# Patient Record
Sex: FEMALE | Race: BLACK OR AFRICAN AMERICAN | Employment: UNEMPLOYED | URBAN - METROPOLITAN AREA
[De-identification: names, ages, dates, MRNs, and addresses within clinical notes are randomized per-mention and may not be internally consistent; named-entity substitution may affect disease eponyms.]

---

## 2024-02-16 ENCOUNTER — HOSPITAL ENCOUNTER (EMERGENCY)
Facility: HOSPITAL | Age: 29
Discharge: HOME | End: 2024-02-17
Attending: EMERGENCY MEDICINE
Payer: COMMERCIAL

## 2024-02-16 ENCOUNTER — APPOINTMENT (OUTPATIENT)
Dept: GENERAL RADIOLOGY | Facility: HOSPITAL | Age: 29
End: 2024-02-16
Attending: EMERGENCY MEDICINE
Payer: COMMERCIAL

## 2024-02-16 DIAGNOSIS — R06.09 DYSPNEA ON EXERTION: Primary | ICD-10-CM

## 2024-02-16 DIAGNOSIS — R79.89 HIGH SERUM THYROID STIMULATING HORMONE (TSH): ICD-10-CM

## 2024-02-16 DIAGNOSIS — K90.0 CELIAC DISEASE (HCC): ICD-10-CM

## 2024-02-16 DIAGNOSIS — E66.01 CLASS 3 SEVERE OBESITY WITH BODY MASS INDEX (BMI) OF 50.0 TO 59.9 IN ADULT, UNSPECIFIED OBESITY TYPE, UNSPECIFIED WHETHER SERIOUS COMORBIDITY PRESENT (HCC): ICD-10-CM

## 2024-02-16 DIAGNOSIS — E28.2 PCOS (POLYCYSTIC OVARIAN SYNDROME): ICD-10-CM

## 2024-02-16 DIAGNOSIS — E07.9 THYROID DISEASE: ICD-10-CM

## 2024-02-16 LAB
ALBUMIN SERPL-MCNC: 4.2 G/DL (ref 3.2–4.8)
ALBUMIN/GLOB SERPL: 1.4 {RATIO} (ref 1–2)
ALP LIVER SERPL-CCNC: 49 U/L
ALT SERPL-CCNC: 11 U/L
ANION GAP SERPL CALC-SCNC: 2 MMOL/L (ref 0–18)
AST SERPL-CCNC: 13 U/L (ref ?–34)
BASOPHILS # BLD AUTO: 0.02 X10(3) UL (ref 0–0.2)
BASOPHILS NFR BLD AUTO: 0.3 %
BILIRUB SERPL-MCNC: 0.2 MG/DL (ref 0.3–1.2)
BNP SERPL-MCNC: 10 PG/ML
BUN BLD-MCNC: 10 MG/DL (ref 9–23)
BUN/CREAT SERPL: 10.9 (ref 10–20)
CALCIUM BLD-MCNC: 9.5 MG/DL (ref 8.7–10.4)
CHLORIDE SERPL-SCNC: 110 MMOL/L (ref 98–112)
CO2 SERPL-SCNC: 28 MMOL/L (ref 21–32)
CREAT BLD-MCNC: 0.92 MG/DL
DEPRECATED RDW RBC AUTO: 43.9 FL (ref 35.1–46.3)
EGFRCR SERPLBLD CKD-EPI 2021: 88 ML/MIN/1.73M2 (ref 60–?)
EOSINOPHIL # BLD AUTO: 0.16 X10(3) UL (ref 0–0.7)
EOSINOPHIL NFR BLD AUTO: 2.3 %
ERYTHROCYTE [DISTWIDTH] IN BLOOD BY AUTOMATED COUNT: 13.6 % (ref 11–15)
FLUAV + FLUBV RNA SPEC NAA+PROBE: NEGATIVE
FLUAV + FLUBV RNA SPEC NAA+PROBE: NEGATIVE
GLOBULIN PLAS-MCNC: 2.9 G/DL (ref 2.8–4.4)
GLUCOSE BLD-MCNC: 119 MG/DL (ref 70–99)
HCT VFR BLD AUTO: 35.7 %
HETEROPH AB SER QL: NEGATIVE
HGB BLD-MCNC: 12 G/DL
IMM GRANULOCYTES # BLD AUTO: 0.02 X10(3) UL (ref 0–1)
IMM GRANULOCYTES NFR BLD: 0.3 %
LYMPHOCYTES # BLD AUTO: 2.84 X10(3) UL (ref 1–4)
LYMPHOCYTES NFR BLD AUTO: 41.6 %
MCH RBC QN AUTO: 29.5 PG (ref 26–34)
MCHC RBC AUTO-ENTMCNC: 33.6 G/DL (ref 31–37)
MCV RBC AUTO: 87.7 FL
MONOCYTES # BLD AUTO: 0.41 X10(3) UL (ref 0.1–1)
MONOCYTES NFR BLD AUTO: 6 %
NEUTROPHILS # BLD AUTO: 3.37 X10 (3) UL (ref 1.5–7.7)
NEUTROPHILS # BLD AUTO: 3.37 X10(3) UL (ref 1.5–7.7)
NEUTROPHILS NFR BLD AUTO: 49.5 %
OSMOLALITY SERPL CALC.SUM OF ELEC: 290 MOSM/KG (ref 275–295)
PLATELET # BLD AUTO: 330 10(3)UL (ref 150–450)
POTASSIUM SERPL-SCNC: 4.2 MMOL/L (ref 3.5–5.1)
PROT SERPL-MCNC: 7.1 G/DL (ref 5.7–8.2)
RBC # BLD AUTO: 4.07 X10(6)UL
RSV RNA SPEC NAA+PROBE: NEGATIVE
SARS-COV-2 RNA RESP QL NAA+PROBE: NOT DETECTED
SODIUM SERPL-SCNC: 140 MMOL/L (ref 136–145)
T4 FREE SERPL-MCNC: 0.9 NG/DL (ref 0.8–1.7)
TROPONIN I SERPL HS-MCNC: 3 NG/L
TSI SER-ACNC: 6.76 MIU/ML (ref 0.55–4.78)
WBC # BLD AUTO: 6.8 X10(3) UL (ref 4–11)

## 2024-02-16 PROCEDURE — 80053 COMPREHEN METABOLIC PANEL: CPT | Performed by: EMERGENCY MEDICINE

## 2024-02-16 PROCEDURE — 86664 EPSTEIN-BARR NUCLEAR ANTIGEN: CPT | Performed by: EMERGENCY MEDICINE

## 2024-02-16 PROCEDURE — 93005 ELECTROCARDIOGRAM TRACING: CPT

## 2024-02-16 PROCEDURE — 84443 ASSAY THYROID STIM HORMONE: CPT | Performed by: EMERGENCY MEDICINE

## 2024-02-16 PROCEDURE — 80053 COMPREHEN METABOLIC PANEL: CPT

## 2024-02-16 PROCEDURE — 84484 ASSAY OF TROPONIN QUANT: CPT | Performed by: EMERGENCY MEDICINE

## 2024-02-16 PROCEDURE — 84702 CHORIONIC GONADOTROPIN TEST: CPT

## 2024-02-16 PROCEDURE — 84439 ASSAY OF FREE THYROXINE: CPT | Performed by: EMERGENCY MEDICINE

## 2024-02-16 PROCEDURE — 93010 ELECTROCARDIOGRAM REPORT: CPT

## 2024-02-16 PROCEDURE — 0241U SARS-COV-2/FLU A AND B/RSV BY PCR (GENEXPERT): CPT | Performed by: EMERGENCY MEDICINE

## 2024-02-16 PROCEDURE — 86665 EPSTEIN-BARR CAPSID VCA: CPT | Performed by: EMERGENCY MEDICINE

## 2024-02-16 PROCEDURE — 86308 HETEROPHILE ANTIBODY SCREEN: CPT | Performed by: EMERGENCY MEDICINE

## 2024-02-16 PROCEDURE — 99284 EMERGENCY DEPT VISIT MOD MDM: CPT

## 2024-02-16 PROCEDURE — 85025 COMPLETE CBC W/AUTO DIFF WBC: CPT

## 2024-02-16 PROCEDURE — 85025 COMPLETE CBC W/AUTO DIFF WBC: CPT | Performed by: EMERGENCY MEDICINE

## 2024-02-16 PROCEDURE — 36415 COLL VENOUS BLD VENIPUNCTURE: CPT

## 2024-02-16 PROCEDURE — 99285 EMERGENCY DEPT VISIT HI MDM: CPT

## 2024-02-16 PROCEDURE — 83880 ASSAY OF NATRIURETIC PEPTIDE: CPT | Performed by: EMERGENCY MEDICINE

## 2024-02-16 PROCEDURE — 71045 X-RAY EXAM CHEST 1 VIEW: CPT | Performed by: EMERGENCY MEDICINE

## 2024-02-17 VITALS
TEMPERATURE: 97 F | HEART RATE: 92 BPM | RESPIRATION RATE: 18 BRPM | DIASTOLIC BLOOD PRESSURE: 88 MMHG | SYSTOLIC BLOOD PRESSURE: 143 MMHG | HEIGHT: 66 IN | OXYGEN SATURATION: 99 %

## 2024-02-17 LAB
ATRIAL RATE: 79 BPM
P AXIS: 58 DEGREES
P-R INTERVAL: 140 MS
Q-T INTERVAL: 374 MS
QRS DURATION: 84 MS
QTC CALCULATION (BEZET): 428 MS
R AXIS: 54 DEGREES
T AXIS: 47 DEGREES
VENTRICULAR RATE: 79 BPM

## 2024-02-17 NOTE — ED PROVIDER NOTES
Received signout from  awaiting rest of lab work prior to discharge.  Lab work was unremarkable for acute findings on my interpretation.  The patient was upset as she felt there is no answer for her dyspnea and I discussed the findings with her and it does appear that the patient has had quite an extensive workup over the last 3 weeks involving multiple ED visits with negative D-dimers however ultimately did have a CT PE scan performed in addition to an echocardiogram as well as right heart catheterization for pulmonary hypertension which was negative and has seen cardiology at [facility] in addition to pulmonology for her 3 weeks of dyspnea.  She did request referrals at [facility] as well for a second opinion, I did provide her with both pulmonology and cardiology referrals.  At time of discharge, patient and  were comfortable with discharge plan.

## 2024-02-17 NOTE — ED PROVIDER NOTES
Patient Seen in: [facility] Emergency Department      History     Chief Complaint   Patient presents with    Difficulty Breathing     Stated Complaint: Shortness of Breath    Subjective:   HPI        Objective:   No pertinent past medical history.            No pertinent past surgical history.              No pertinent social history.            Review of Systems    Positive for stated complaint: Shortness of Breath  Other systems are as noted in HPI.  Constitutional and vital signs reviewed.      All other systems reviewed and negative except as noted above.    Physical Exam     ED Triage Vitals [02/16/24 2121]   BP (!) 169/89   Pulse 77   Resp 20   Temp 96.9 °F (36.1 °C)   Temp src Temporal   SpO2 99 %   O2 Device None (Room air)       Current:/88   Pulse 66   Temp 96.9 °F (36.1 °C) (Temporal)   Resp 24   Ht 167.6 cm (5' 6\")   SpO2 97%         Physical Exam          ED Course     Labs Reviewed   COMP METABOLIC PANEL (14) - Abnormal; Notable for the following components:       Result Value    Glucose 119 (*)     Bilirubin, Total 0.2 (*)     All other components within normal limits   TROPONIN I HIGH SENSITIVITY - Normal   PRO BETA NATRIURETIC PEPTIDE - Normal   CBC WITH DIFFERENTIAL WITH PLATELET    Narrative:     The following orders were created for panel order CBC With Differential With Platelet.  Procedure                               Abnormality         Status                     ---------                               -----------         ------                     CBC W/ DIFFERENTIAL[[id number]]                              Final result                 Please view results for these tests on the individual orders.   MONO QUAL, RFX TO EBV-VCA ON NEG   TSH W REFLEX TO FREE T4   SARS-COV-2/FLU A AND B/RSV BY PCR (GENEXPERT)   CBC W/ DIFFERENTIAL     EKG    Rate, intervals and axes as noted on EKG Report.  Rate: 79  Rhythm: Sinus Rhythm  Reading: Normal sinus rhythm without signs of acute  ischemia or abnormal intervals.                ED Course as of 02/16/24 2305  ------------------------------------------------------------  Time: 02/16 2231  Value: XR CHEST AP PORTABLE  (CPT=71045)  Comment: IMPRESSION:    Clear well-expanded lungs.  No pleural effusion or pneumothorax.  The heart appears mildly enlarged, requires workup in a patient of this age.                MDM      28-year-old female who denies significant past medical history presents today with shortness of breath.  Patient states that she has been having increasing shortness of breath for the last 2 to 3 weeks.  Symptoms started rather suddenly.  She has some associated chest pressure.  She went from being able to walk normally to now becoming short of breath even with walking about 20 feet.  Also propping herself up with pillows at night.  Denies fevers, cough, sore throat, leg swelling, extremity pain/numbness, or other symptoms.  Not taking estrogen containing birth control    On exam, initially hypertensive 160s over 80s which resolved without intervention and otherwise normal vitals, nontoxic-appearing, clear lungs, normal heart sounds, soft nontender abdomen, no extremity swelling or edema, no JVD    Differential: Viral illness, pneumonia, new onset CHF,    Point-of-care ultrasound performed and interpreted by me-  -No pericardial effusion  -Normal LV EF  -RV<LV  -Normal range IVC  -Normal caliber aortic outflow tract    I independently reviewed the patient's chest x-ray. No clear evidence of consolidation or pneumothorax. Mild cardiomegaly.                                   Medical Decision Making      Disposition and Plan     Clinical Impression:  1. Dyspnea on exertion         Disposition:  There is no disposition on file for this visit.  There is no disposition time on file for this visit.    Follow-up:  your primary care doctor    Follow up in 1 week(s)            Medications Prescribed:  There are no discharge medications for  this patient.

## 2024-02-17 NOTE — ED QUICK NOTES
Pt discharged to home with family member.  Pt provided with updated discharge instructions to include additional referrals.  Pt is tearful upon discharge.  Pt provided with a box of kleenex and offered any additional assistance.  Pt encouraged to follow-up with referrals provided and return sooner with any worsening of symptoms.

## 2024-02-19 ENCOUNTER — OFFICE VISIT (OUTPATIENT)
Dept: ENDOCRINOLOGY CLINIC | Facility: CLINIC | Age: 29
End: 2024-02-19
Payer: COMMERCIAL

## 2024-02-19 ENCOUNTER — EXTERNAL RECORD (OUTPATIENT)
Dept: HEALTH INFORMATION MANAGEMENT | Facility: OTHER | Age: 29
End: 2024-02-19

## 2024-02-19 ENCOUNTER — EXTERNAL RECORD (OUTPATIENT)
Dept: OTHER | Age: 29
End: 2024-02-19

## 2024-02-19 VITALS
DIASTOLIC BLOOD PRESSURE: 93 MMHG | HEIGHT: 66 IN | SYSTOLIC BLOOD PRESSURE: 133 MMHG | BODY MASS INDEX: 47.09 KG/M2 | WEIGHT: 293 LBS

## 2024-02-19 DIAGNOSIS — R79.89 HIGH SERUM THYROID STIMULATING HORMONE (TSH): Primary | ICD-10-CM

## 2024-02-19 DIAGNOSIS — E66.01 CLASS 3 SEVERE OBESITY WITH BODY MASS INDEX (BMI) OF 50.0 TO 59.9 IN ADULT, UNSPECIFIED OBESITY TYPE, UNSPECIFIED WHETHER SERIOUS COMORBIDITY PRESENT (HCC): ICD-10-CM

## 2024-02-19 DIAGNOSIS — E11.42 DIABETIC PERIPHERAL NEUROPATHY (HCC): ICD-10-CM

## 2024-02-19 DIAGNOSIS — E28.2 PCOS (POLYCYSTIC OVARIAN SYNDROME): ICD-10-CM

## 2024-02-19 DIAGNOSIS — K90.0 CELIAC DISEASE (HCC): ICD-10-CM

## 2024-02-19 DIAGNOSIS — E07.9 THYROID DISEASE: ICD-10-CM

## 2024-02-19 DIAGNOSIS — R79.89 LOW VITAMIN D LEVEL: ICD-10-CM

## 2024-02-19 DIAGNOSIS — M35.9 AUTOIMMUNE DISEASE (HCC): ICD-10-CM

## 2024-02-19 DIAGNOSIS — E78.2 MIXED HYPERLIPIDEMIA: ICD-10-CM

## 2024-02-19 DIAGNOSIS — E11.21 DIABETIC NEPHROPATHY ASSOCIATED WITH TYPE 2 DIABETES MELLITUS (HCC): ICD-10-CM

## 2024-02-19 LAB
B-HCG SERPL-ACNC: <2.6 MIU/ML
EBV NA IGG SER QL IA: POSITIVE
EBV VCA IGG SER QL IA: POSITIVE
EBV VCA IGM SER QL IA: NEGATIVE

## 2024-02-19 NOTE — PROGRESS NOTES
New Patient Evaluation - History and Physical    CONSULT - Reason for Visit:  abnormal TSH .  Requesting Physician:  Verona Jefferson  ..No primary care provider on file.      CHIEF COMPLAINT:    Chief Complaint   Patient presents with    Consult     Thyroid         HISTORY OF PRESENT ILLNESS:   Concepcion Alvarado is a 28 year old female who presents with  high TSH   She has hx of celiac, PCOs and morbid obesity     Has difficulty in breathing with minimal physical activity   Gets lightheaded     LMP - had implant removed last week. Stopped bleeding yesterday     Never been pregnancy   Has dry skin, fatigue, wt gain and dizziness with exercise   Seeing cardiology now     I Reviewed and discussed her previous w/u     ASSESSMENT AND PLAN:  29 yo woman with high TSH, celiac and morbid obesity   She had labs in the ER showed TSH 6 and FT4 0.9        Higher risk to have hashimoto's since you already have celiac.     I d/w pt, Labs showed normal FT4 and high TSH  For shortness of breath f/u with PCP     Plan  Will add labs (TPO and HCG) to sample from Friday   Will check hashimoto's antibodies and repeat labs in 1 mo  If you get pregnant are planning pregnancy, we need to monitor thyroid levels closely   Labs and f/u in 1 mo         This is a very helpful website     [email or web address]    [email or web address]          PAST MEDICAL HISTORY:   History reviewed. No pertinent past medical history.  High TSH   Celiac dz  Pcos  Morbid obesity     PAST SURGICAL HISTORY:   History reviewed. No pertinent surgical history.  Tonsillectomy  was 13     CURRENT MEDICATIONS:    No outpatient medications have been marked as taking for the 2/19/24 encounter (Office Visit) with Kelin Bang MD.   None  No biotin or turmeric         ALLERGIES:  No Known Allergies  None     SOCIAL HISTORY:    Social History     Socioeconomic History    Marital status:    Tobacco Use    Smoking  status: Never    Smokeless tobacco: Never   Vaping Use    Vaping Use: Never used   Substance and Sexual Activity    Alcohol use: Yes     Alcohol/week: 7.0 standard drinks of alcohol     Types: 3 Glasses of wine, 4 Cans of beer per week     Comment: during the week   Not working now   No smoking   Etoh occ   No drugs     FAMILY HISTORY:   History reviewed. No pertinent family history.   No thyroid disease   Mother with DM     REVIEW OF SYSTEMS:  Lightheadedness   All negative other than HPI      PHYSICAL EXAM:   Height: 5' 6\" (167.6 cm) (02/19 1532)  Weight: 357 lb (161.9 kg) (02/19 1532)  BSA (Calculated - sq m): 2.56 sq meters (02/19 1532)  Pulse: --  BP: 133/93 (02/19 1532)  Temp: --  Do Not Use - Resp Rate: --  SpO2: --    Body mass index is 57.62 kg/m².    Obese   No goiter   No tremors   No striae     CONSTITUTIONAL:  Awake and alert. Age appropriate, good hygiene not in acute distress. Well nourished and well developed. no acute distress   PSYCH:   Orientated to time, place, person & situation, Normal mood and affect, memory intact, normal insight and judgment, cooperative  Neuro: speech is clear. Awake, alert, no aphasia, no facial asymmetry, no nuchal rigidity  EYES:  No proptosis, no ptosis, conjunctiva normal  ENT:  Normocephalic, atraumatic  Eye: EOMI, normal lids, no discharge, no conjunctival erythema. No exophthalmos/proptosis, Ptosis negative   No rhinorrhea, moist oral mucosa  Neck: full range of motion  Neck/Thyroid: neck inspection: normal, No scar, No goiter   LUNGS:  No acute respiratory distress, non-labored respiration. Speaking full sentences  CARDIOVASCULAR:  regular rate   ABDOMEN:  No abdominal pain.   SKIN:  no bruising or bleeding, no rashes and no lesions, Skin is dry, no obvious rashes or lesions  EXTREMITIES: no gross abnormality   MSK: Moves extremities spontaneously. full range of motion in all major joints      DATA:     Pertinent data reviewed              Recent Labs      02/16/24 2232   TSH 6.760*   T4F 0.9     XR CHEST AP PORTABLE  (CPT=71045)    Result Date: 2/17/2024  CONCLUSION: Normal examination.     Dictated by (CST): Sathya Gould MD on 2/17/2024 at 7:44 AM     Finalized by (CST): Sathya Gould MD on 2/17/2024 at 7:44 AM               Orders Placed This Encounter   Procedures    HCG, Beta Subunit (Quant Pregnancy Test)    TSH and Free T4    Thyroid Peroxidase (TPO) AB    THYROGLOBULIN ANTIBODY    Comp Metabolic Panel (14)    Hemoglobin A1C     Orders Placed This Encounter    HCG, Beta Subunit (Quant Pregnancy Test)     Standing Status:   Future     Number of Occurrences:   1     Standing Expiration Date:   2/19/2025     Order Specific Question:   Release to patient     Answer:   Immediate    TSH and Free T4     Standing Status:   Future     Standing Expiration Date:   2/19/2025     Order Specific Question:   Release to patient     Answer:   Immediate    Thyroid Peroxidase (TPO) AB     Order Specific Question:   Release to patient     Answer:   Immediate    THYROGLOBULIN ANTIBODY     Standing Status:   Future     Standing Expiration Date:   2/19/2025     Order Specific Question:   Release to patient     Answer:   Immediate    Comp Metabolic Panel (14)     Standing Status:   Future     Standing Expiration Date:   2/19/2025     Order Specific Question:   Release to patient     Answer:   Immediate    Hemoglobin A1C     Standing Status:   Future     Standing Expiration Date:   2/19/2025     Order Specific Question:   Release to patient     Answer:   Immediate          This is a specialized patient consultation in endocrinology and required comprehensive review of prior records, as well as current evaluation, with time required for consideration of complex endocrine issues and consultation. For this visit, I personally interviewed the patient, and family member if accompanied, performed the pertinent parts of the history and physical examination. ROS included screening for  appropriate endocrine conditions.   Today's diagnosis and plan were reviewed in detail with the patient who states understanding and agrees with plan. I discussed with the patient possible diagnosis, differential diagnosis, need for work up , treatment options, alternatives and side effects.     Please see note for details about time spent which includes:   · pre-visit preparation  · reviewing records  · face to face time with the patient   · timely documentation of the encounter  · ordering medications/tests  · communication with care team  · care coordination    I appreciate the opportunity to be part of your patient's medical care and will keep you, as the referring and primary physicians, informed about the care of your patient, including possible future surgery and pathology findings. Please feel free to contact me should you have any questions.      Kelin Bang MD

## 2024-02-19 NOTE — PATIENT INSTRUCTIONS
Higher risk to have hashimoto's since you already have celiac.   Labs showed normal FT4 and high TSH  For shortness of breath f/u with PCP     Will add labs (TPO and HCG) to sample from Friday   Will check hashimoto's antibodies and repeat labs in 1 mo  If you get pregnant are planning pregnancy, we need to monitor thyroid levels closely   Labs and f/u in 1 mo         This is a very helpful website     [email or web address]    [email or web address]

## 2024-02-22 ENCOUNTER — PATIENT MESSAGE (OUTPATIENT)
Dept: ENDOCRINOLOGY CLINIC | Facility: CLINIC | Age: 29
End: 2024-02-22

## 2024-02-22 DIAGNOSIS — E07.9 THYROID DISEASE: Primary | ICD-10-CM

## 2024-02-23 NOTE — TELEPHONE ENCOUNTER
From: Concepcion Alvarado  To: Kelin Bang  Sent: 2/22/2024 9:05 AM CST  Subject: Hashimoto's blood test    Good morning,  I know you mentioned that I will need to redo bloodwork before my next appointment, so I will do that in a few weeks. However, you also mentioned that there were blood tests you were going to run with the sample left over from my ER visit last week. Have those tests already been completed?    Thank you

## 2024-02-23 NOTE — TELEPHONE ENCOUNTER
Last visit I added labs to previous sample, TPO  Can you please check if they were done   Update the pt please if a redraw is required   thanks

## 2024-02-26 RX ORDER — TIRZEPATIDE 2.5 MG/.5ML
2.5 INJECTION, SOLUTION SUBCUTANEOUS
Qty: 2 ML | Refills: 3 | Status: SHIPPED | OUTPATIENT
Start: 2024-02-26

## 2024-02-26 NOTE — TELEPHONE ENCOUNTER
Unable to add on TPO from labs from 2/14/24. See her message regarding Wegovy. There is a back order of Wegovy starting doses.

## 2024-02-27 ENCOUNTER — OFFICE VISIT (OUTPATIENT)
Facility: CLINIC | Age: 29
End: 2024-02-27
Payer: COMMERCIAL

## 2024-02-27 VITALS
OXYGEN SATURATION: 96 % | RESPIRATION RATE: 16 BRPM | SYSTOLIC BLOOD PRESSURE: 132 MMHG | WEIGHT: 293 LBS | DIASTOLIC BLOOD PRESSURE: 68 MMHG | HEIGHT: 66 IN | HEART RATE: 78 BPM | BODY MASS INDEX: 47.09 KG/M2

## 2024-02-27 DIAGNOSIS — E66.01 CLASS 3 SEVERE OBESITY WITHOUT SERIOUS COMORBIDITY WITH BODY MASS INDEX (BMI) OF 50.0 TO 59.9 IN ADULT, UNSPECIFIED OBESITY TYPE (HCC): ICD-10-CM

## 2024-02-27 DIAGNOSIS — R06.02 SHORTNESS OF BREATH: Primary | ICD-10-CM

## 2024-02-27 PROCEDURE — 3008F BODY MASS INDEX DOCD: CPT | Performed by: INTERNAL MEDICINE

## 2024-02-27 PROCEDURE — 3075F SYST BP GE 130 - 139MM HG: CPT | Performed by: INTERNAL MEDICINE

## 2024-02-27 PROCEDURE — 99204 OFFICE O/P NEW MOD 45 MIN: CPT | Performed by: INTERNAL MEDICINE

## 2024-02-27 PROCEDURE — 3078F DIAST BP <80 MM HG: CPT | Performed by: INTERNAL MEDICINE

## 2024-02-27 NOTE — PROGRESS NOTES
[facility] General Pulmonary Consult Note    Chief Complaint:  Chief Complaint   Patient presents with    New Patient     Dyspnea on exertion and at rest CXR 2/16       History of Present Illness:  Concepcion Alvarado is a 28 year old female with no significant PMH who presents today for evaluation of sudden onset shortness of breath.  Patient was in her usual state of health when 2 weeks ago she had sudden onset shortness of breath.  She had workup at another pulmonologist office and a CT angiogram with PE was performed as well as echo that showed some possible pulmonary hypertension was performed.  She had a subsequent right heart cath with normal right-sided pressures.  Chest x-ray performed, which was normal.  Patient is a never smoker.  She has gotten to the point where she is getting winded with very minimal exertion.      Past Medical History:   Past Medical History:   Diagnosis Date    ALCOHOL USE     Obesity         Past Surgical History:   Past Surgical History:   Procedure Laterality Date    TONSILLECTOMY         Family Medical History:   Family History   Problem Relation Age of Onset    Diabetes Mother     Obesity Father         Social History:   Social History     Socioeconomic History    Marital status:      Spouse name: Not on file    Number of children: Not on file    Years of education: Not on file    Highest education level: Not on file   Occupational History    Not on file   Tobacco Use    Smoking status: Never    Smokeless tobacco: Never   Vaping Use    Vaping Use: Never used   Substance and Sexual Activity    Alcohol use: Yes     Alcohol/week: 3.0 standard drinks of alcohol     Types: 3 Standard drinks or equivalent per week     Comment: during the week    Drug use: Never    Sexual activity: Not on file   Other Topics Concern    Not on file   Social History Narrative    Not on file     Social Determinants of Health     Financial Resource Strain: Not on file   Food Insecurity: Not on file    Transportation Needs: Not on file   Physical Activity: Not on file   Stress: Not on file   Social Connections: Not on file   Housing Stability: Not on file        Allergies: Patient has no known allergies.     Medications:   Current Outpatient Medications   Medication Sig Dispense Refill    Tirzepatide (MOUNJARO) 2.5 MG/0.5ML Subcutaneous Solution Pen-injector Inject 2.5 mg into the skin every 7 days. (Patient not taking: Reported on 2/27/2024) 2 mL 3       Review of Systems: Review of Systems    Physical Exam:  /68 (BP Location: Left arm, Patient Position: Sitting, Cuff Size: large)   Pulse 78   Resp 16   Ht 5' 6\" (1.676 m)   Wt (!) 357 lb (161.9 kg)   SpO2 96%   BMI 57.62 kg/m²      Constitutional: alert, cooperative. No acute distress.  HEENT: Head NC/AT. Nasal turbinates appear normal.  Mallimpati 1+  Cardio: Regular rate and rhythm. Normal S1 and S2. No murmurs.   Respiratory: Thorax symmetrical with no labored breathing. Clear to ausculation bilaterally with symmetrical breath sounds. No wheezing, rhonchi, rales, or crackles.   GI: NABS. Abd soft, non-tender.  Extremities: No clubbing or cyanosis. No BLE edema.    Neurologic: A&Ox3. No gross motor deficits.  Skin: Warm, dry  Psych: Calm, cooperative. Pleasant affect.    Results:  Personally reviewed  WBC: 6.8, done on 2/16/2024.  HGB: 12, done on 2/16/2024.  PLT: 330, done on 2/16/2024.     Glucose: 119, done on 2/16/2024.  Cr: 0.92, done on 2/16/2024.  CA: 9.5, done on 2/16/2024.  Na: 140, done on 2/16/2024.  K: 4.2, done on 2/16/2024.  Cl: 110, done on 2/16/2024.  CO2: 28, done on 2/16/2024.  Last ALB was 4.2% done on 2/16/2024.     XR CHEST AP PORTABLE  (CPT=71045)    Result Date: 2/17/2024  CONCLUSION: Normal examination.     Dictated by (CST): Sathya Gould MD on 2/17/2024 at 7:44 AM     Finalized by (CST): Sathya Gould MD on 2/17/2024 at 7:44 AM            Assessment/Plan:  #1.  Sudden onset shortness of breath  Major causes of acute  dyspnea have been ruled out  CT PE negative for PE  Right heart cath negative for pulmonary hypertension  Would plan to check pfts to rule out obstruction/asthma  If there is any suggestion of asthma on PFTs, would consider allergy testing  Obesity playing a role as well    No follow-ups on file.    Lorie Cody MD  2/27/2024

## 2024-03-01 ENCOUNTER — LAB ENCOUNTER (OUTPATIENT)
Dept: LAB | Facility: HOSPITAL | Age: 29
End: 2024-03-01
Attending: INTERNAL MEDICINE
Payer: COMMERCIAL

## 2024-03-01 DIAGNOSIS — E07.9 THYROID DISEASE: ICD-10-CM

## 2024-03-01 LAB — THYROPEROXIDASE AB SERPL-ACNC: 50 U/ML (ref ?–60)

## 2024-03-01 PROCEDURE — 36415 COLL VENOUS BLD VENIPUNCTURE: CPT

## 2024-03-01 PROCEDURE — 86376 MICROSOMAL ANTIBODY EACH: CPT

## 2024-03-09 ENCOUNTER — HOSPITAL ENCOUNTER (OUTPATIENT)
Dept: RESPIRATORY THERAPY | Facility: HOSPITAL | Age: 29
Discharge: HOME | End: 2024-03-09
Attending: INTERNAL MEDICINE
Payer: COMMERCIAL

## 2024-03-09 ENCOUNTER — EXTERNAL RECORD (OUTPATIENT)
Dept: HEALTH INFORMATION MANAGEMENT | Facility: OTHER | Age: 29
End: 2024-03-09

## 2024-03-09 DIAGNOSIS — R06.02 SHORTNESS OF BREATH: ICD-10-CM

## 2024-03-09 PROCEDURE — 94729 DIFFUSING CAPACITY: CPT

## 2024-03-09 PROCEDURE — 94726 PLETHYSMOGRAPHY LUNG VOLUMES: CPT

## 2024-03-09 PROCEDURE — 94010 BREATHING CAPACITY TEST: CPT

## 2024-03-12 ENCOUNTER — LAB ENCOUNTER (OUTPATIENT)
Dept: LAB | Facility: HOSPITAL | Age: 29
End: 2024-03-12
Attending: INTERNAL MEDICINE
Payer: COMMERCIAL

## 2024-03-12 DIAGNOSIS — E07.9 DISEASE OF THYROID GLAND: ICD-10-CM

## 2024-03-12 DIAGNOSIS — E28.2 PCOS (POLYCYSTIC OVARIAN SYNDROME): ICD-10-CM

## 2024-03-12 DIAGNOSIS — E78.2 MIXED HYPERLIPIDEMIA: ICD-10-CM

## 2024-03-12 DIAGNOSIS — E66.01 CLASS 3 SEVERE OBESITY WITH BODY MASS INDEX (BMI) OF 50.0 TO 59.9 IN ADULT, UNSPECIFIED OBESITY TYPE, UNSPECIFIED WHETHER SERIOUS COMORBIDITY PRESENT (HCC): ICD-10-CM

## 2024-03-12 DIAGNOSIS — K90.0 CELIAC DISEASE (HCC): ICD-10-CM

## 2024-03-12 DIAGNOSIS — E11.42 DIABETIC PERIPHERAL NEUROPATHY (HCC): ICD-10-CM

## 2024-03-12 DIAGNOSIS — R79.89 LOW VITAMIN D LEVEL: ICD-10-CM

## 2024-03-12 DIAGNOSIS — E07.9 THYROID DISEASE: ICD-10-CM

## 2024-03-12 DIAGNOSIS — R79.89 HYPOURICEMIA: Primary | ICD-10-CM

## 2024-03-12 DIAGNOSIS — R79.89 HIGH SERUM THYROID STIMULATING HORMONE (TSH): ICD-10-CM

## 2024-03-12 DIAGNOSIS — E28.2 POLYCYSTIC OVARIES: ICD-10-CM

## 2024-03-12 DIAGNOSIS — M35.9 AUTOIMMUNE DISEASE (HCC): ICD-10-CM

## 2024-03-12 DIAGNOSIS — E11.21 DIABETIC NEPHROPATHY ASSOCIATED WITH TYPE 2 DIABETES MELLITUS (HCC): ICD-10-CM

## 2024-03-12 LAB
ALBUMIN SERPL-MCNC: 4.4 G/DL (ref 3.2–4.8)
ALBUMIN/GLOB SERPL: 1.5 {RATIO} (ref 1–2)
ALP LIVER SERPL-CCNC: 51 U/L
ALT SERPL-CCNC: 21 U/L
ANION GAP SERPL CALC-SCNC: 5 MMOL/L (ref 0–18)
AST SERPL-CCNC: 21 U/L (ref ?–34)
BILIRUB SERPL-MCNC: 0.3 MG/DL (ref 0.3–1.2)
BUN BLD-MCNC: 11 MG/DL (ref 9–23)
BUN/CREAT SERPL: 16.7 (ref 10–20)
CALCIUM BLD-MCNC: 9.8 MG/DL (ref 8.7–10.4)
CHLORIDE SERPL-SCNC: 106 MMOL/L (ref 98–112)
CO2 SERPL-SCNC: 28 MMOL/L (ref 21–32)
CREAT BLD-MCNC: 0.66 MG/DL
EGFRCR SERPLBLD CKD-EPI 2021: 123 ML/MIN/1.73M2 (ref 60–?)
EST. AVERAGE GLUCOSE BLD GHB EST-MCNC: 140 MG/DL (ref 68–126)
FASTING STATUS PATIENT QL REPORTED: YES
GLOBULIN PLAS-MCNC: 3 G/DL (ref 2.8–4.4)
GLUCOSE BLD-MCNC: 90 MG/DL (ref 70–99)
HBA1C MFR BLD: 6.5 % (ref ?–5.7)
OSMOLALITY SERPL CALC.SUM OF ELEC: 287 MOSM/KG (ref 275–295)
POTASSIUM SERPL-SCNC: 3.9 MMOL/L (ref 3.5–5.1)
PROT SERPL-MCNC: 7.4 G/DL (ref 5.7–8.2)
SODIUM SERPL-SCNC: 139 MMOL/L (ref 136–145)
T4 FREE SERPL-MCNC: 1 NG/DL (ref 0.8–1.7)
THYROPEROXIDASE AB SERPL-ACNC: <28 U/ML (ref ?–60)
TSI SER-ACNC: 4.44 MIU/ML (ref 0.55–4.78)

## 2024-03-12 PROCEDURE — 83036 HEMOGLOBIN GLYCOSYLATED A1C: CPT

## 2024-03-12 PROCEDURE — 84443 ASSAY THYROID STIM HORMONE: CPT

## 2024-03-12 PROCEDURE — 86800 THYROGLOBULIN ANTIBODY: CPT

## 2024-03-12 PROCEDURE — 36415 COLL VENOUS BLD VENIPUNCTURE: CPT

## 2024-03-12 PROCEDURE — 84439 ASSAY OF FREE THYROXINE: CPT

## 2024-03-12 PROCEDURE — 80053 COMPREHEN METABOLIC PANEL: CPT

## 2024-03-12 PROCEDURE — 86376 MICROSOMAL ANTIBODY EACH: CPT | Performed by: INTERNAL MEDICINE

## 2024-03-13 ENCOUNTER — APPOINTMENT (OUTPATIENT)
Dept: GENERAL RADIOLOGY | Facility: HOSPITAL | Age: 29
End: 2024-03-13
Attending: NURSE PRACTITIONER
Payer: COMMERCIAL

## 2024-03-13 ENCOUNTER — HOSPITAL ENCOUNTER (OUTPATIENT)
Facility: HOSPITAL | Age: 29
Setting detail: OBSERVATION
Discharge: HOME | End: 2024-03-15
Attending: HOSPITALIST | Admitting: HOSPITALIST
Payer: COMMERCIAL

## 2024-03-13 DIAGNOSIS — R55 SYNCOPE AND COLLAPSE: Primary | ICD-10-CM

## 2024-03-13 LAB
ANION GAP SERPL CALC-SCNC: <0 MMOL/L (ref 0–18)
BASOPHILS # BLD AUTO: 0.03 X10(3) UL (ref 0–0.2)
BASOPHILS NFR BLD AUTO: 0.6 %
BUN BLD-MCNC: 11 MG/DL (ref 9–23)
BUN/CREAT SERPL: 15.7 (ref 10–20)
CALCIUM BLD-MCNC: 10.1 MG/DL (ref 8.7–10.4)
CHLORIDE SERPL-SCNC: 108 MMOL/L (ref 98–112)
CO2 SERPL-SCNC: 29 MMOL/L (ref 21–32)
CREAT BLD-MCNC: 0.7 MG/DL
DEPRECATED RDW RBC AUTO: 44.7 FL (ref 35.1–46.3)
EGFRCR SERPLBLD CKD-EPI 2021: 121 ML/MIN/1.73M2 (ref 60–?)
EOSINOPHIL # BLD AUTO: 0.11 X10(3) UL (ref 0–0.7)
EOSINOPHIL NFR BLD AUTO: 2.1 %
ERYTHROCYTE [DISTWIDTH] IN BLOOD BY AUTOMATED COUNT: 13.8 % (ref 11–15)
GLUCOSE BLD-MCNC: 83 MG/DL (ref 70–99)
GLUCOSE BLDC GLUCOMTR-MCNC: 96 MG/DL (ref 70–99)
HCT VFR BLD AUTO: 41.4 %
HGB BLD-MCNC: 13 G/DL
IMM GRANULOCYTES # BLD AUTO: 0.02 X10(3) UL (ref 0–1)
IMM GRANULOCYTES NFR BLD: 0.4 %
LYMPHOCYTES # BLD AUTO: 2.58 X10(3) UL (ref 1–4)
LYMPHOCYTES NFR BLD AUTO: 48.7 %
MCH RBC QN AUTO: 28 PG (ref 26–34)
MCHC RBC AUTO-ENTMCNC: 31.4 G/DL (ref 31–37)
MCV RBC AUTO: 89 FL
MONOCYTES # BLD AUTO: 0.32 X10(3) UL (ref 0.1–1)
MONOCYTES NFR BLD AUTO: 6 %
NEUTROPHILS # BLD AUTO: 2.24 X10 (3) UL (ref 1.5–7.7)
NEUTROPHILS # BLD AUTO: 2.24 X10(3) UL (ref 1.5–7.7)
NEUTROPHILS NFR BLD AUTO: 42.2 %
OSMOLALITY SERPL CALC.SUM OF ELEC: 281 MOSM/KG (ref 275–295)
PLATELET # BLD AUTO: 343 10(3)UL (ref 150–450)
POTASSIUM SERPL-SCNC: 3.9 MMOL/L (ref 3.5–5.1)
RBC # BLD AUTO: 4.65 X10(6)UL
SODIUM SERPL-SCNC: 136 MMOL/L (ref 136–145)
THYROGLOB AB: <1 IU/ML
THYROGLOB IMA: 11.1 NG/ML
TROPONIN I SERPL HS-MCNC: 3 NG/L
WBC # BLD AUTO: 5.3 X10(3) UL (ref 4–11)

## 2024-03-13 PROCEDURE — 99222 1ST HOSP IP/OBS MODERATE 55: CPT | Performed by: HOSPITALIST

## 2024-03-13 PROCEDURE — 71045 X-RAY EXAM CHEST 1 VIEW: CPT | Performed by: NURSE PRACTITIONER

## 2024-03-13 RX ORDER — ACETAMINOPHEN 500 MG
500 TABLET ORAL EVERY 4 HOURS PRN
Status: DISCONTINUED | OUTPATIENT
Start: 2024-03-13 | End: 2024-03-15

## 2024-03-13 RX ORDER — ONDANSETRON 2 MG/ML
4 INJECTION INTRAMUSCULAR; INTRAVENOUS EVERY 6 HOURS PRN
Status: DISCONTINUED | OUTPATIENT
Start: 2024-03-13 | End: 2024-03-15

## 2024-03-13 RX ORDER — PROCHLORPERAZINE EDISYLATE 5 MG/ML
5 INJECTION INTRAMUSCULAR; INTRAVENOUS EVERY 8 HOURS PRN
Status: DISCONTINUED | OUTPATIENT
Start: 2024-03-13 | End: 2024-03-15

## 2024-03-13 RX ORDER — SODIUM CHLORIDE 9 MG/ML
INJECTION, SOLUTION INTRAVENOUS CONTINUOUS
Status: DISCONTINUED | OUTPATIENT
Start: 2024-03-13 | End: 2024-03-15

## 2024-03-13 RX ORDER — ENOXAPARIN SODIUM 100 MG/ML
0.5 INJECTION SUBCUTANEOUS DAILY
Status: DISCONTINUED | OUTPATIENT
Start: 2024-03-14 | End: 2024-03-15

## 2024-03-13 RX ORDER — ENOXAPARIN SODIUM 100 MG/ML
40 INJECTION SUBCUTANEOUS DAILY
Status: DISCONTINUED | OUTPATIENT
Start: 2024-03-14 | End: 2024-03-13

## 2024-03-13 RX ORDER — TEMAZEPAM 15 MG/1
15 CAPSULE ORAL NIGHTLY PRN
Status: DISCONTINUED | OUTPATIENT
Start: 2024-03-13 | End: 2024-03-15

## 2024-03-13 RX ORDER — SODIUM CHLORIDE 9 MG/ML
INJECTION, SOLUTION INTRAVENOUS CONTINUOUS
Status: ACTIVE | OUTPATIENT
Start: 2024-03-13 | End: 2024-03-13

## 2024-03-13 NOTE — ED PROVIDER NOTES
Patient Seen in: [facility] Emergency Department      History     Chief Complaint   Patient presents with    Syncope     Stated Complaint: Syncope    Subjective:   27yo/f w hx of PCOS, Obesity reports to the ED w c/o a syncopal episode, chest pain, dyspnea. She reports months of symptoms. Has been seen in ED, has had eval by [facility] cardiology. Has had a RHC, CTPE, and ECHO that did not demonstrate acute pathology. Reportedly yesterday she stood up and felt dizzy, tried to get her bearings, and (per her ) gently went to the ground. Reports some amnesia to the episode.             Objective:   Past Medical History:   Diagnosis Date    ALCOHOL USE     Celiac disease (HCC)     Obesity     PCOS (polycystic ovarian syndrome)               Past Surgical History:   Procedure Laterality Date    TONSILLECTOMY                  Social History     Socioeconomic History    Marital status:    Tobacco Use    Smoking status: Never    Smokeless tobacco: Never   Vaping Use    Vaping Use: Never used   Substance and Sexual Activity    Alcohol use: Yes     Alcohol/week: 3.0 standard drinks of alcohol     Types: 3 Standard drinks or equivalent per week     Comment: during the week    Drug use: Never              Review of Systems   All other systems reviewed and are negative.      Positive for stated complaint: Syncope  Other systems are as noted in HPI.  Constitutional and vital signs reviewed.      All other systems reviewed and negative except as noted above.    Physical Exam     ED Triage Vitals   BP 03/13/24 1304 (!) 161/95   Pulse 03/13/24 1304 79   Resp 03/13/24 1304 16   Temp 03/13/24 1325 97 °F (36.1 °C)   Temp src 03/13/24 1325 Temporal   SpO2 03/13/24 1304 98 %   O2 Device 03/13/24 1304 None (Room air)       Current:/83   Pulse 89   Temp 97 °F (36.1 °C) (Temporal)   Resp 16   Ht 167.6 cm (5' 6\")   Wt (!) 158.8 kg   LMP  (Within Months)   SpO2 98%   BMI 56.49 kg/m²         Physical Exam  Vitals  and nursing note reviewed.   Constitutional:       General: She is not in acute distress.     Appearance: She is well-developed.   HENT:      Head: Normocephalic and atraumatic.      Nose: Nose normal.      Mouth/Throat:      Mouth: Mucous membranes are moist.   Eyes:      Conjunctiva/sclera: Conjunctivae normal.      Pupils: Pupils are equal, round, and reactive to light.   Cardiovascular:      Rate and Rhythm: Normal rate and regular rhythm.      Heart sounds: Normal heart sounds.   Pulmonary:      Effort: Pulmonary effort is normal.      Breath sounds: Normal breath sounds.   Abdominal:      General: Bowel sounds are normal.      Palpations: Abdomen is soft.      Tenderness: There is no abdominal tenderness.   Musculoskeletal:         General: No tenderness or deformity. Normal range of motion.      Cervical back: Normal range of motion and neck supple.   Skin:     General: Skin is warm and dry.      Capillary Refill: Capillary refill takes less than 2 seconds.      Findings: No rash.      Comments: Normal color   Neurological:      General: No focal deficit present.      Mental Status: She is alert and oriented to person, place, and time.      GCS: GCS eye subscore is 4. GCS verbal subscore is 5. GCS motor subscore is 6.      Cranial Nerves: No cranial nerve deficit.      Gait: Gait normal.           ED Course     Labs Reviewed   BASIC METABOLIC PANEL (8) - Abnormal; Notable for the following components:       Result Value    Anion Gap <0 (*)     All other components within normal limits   TROPONIN I HIGH SENSITIVITY - Normal   POCT GLUCOSE - Normal   CBC WITH DIFFERENTIAL WITH PLATELET    Narrative:     The following orders were created for panel order CBC With Differential With Platelet.  Procedure                               Abnormality         Status                     ---------                               -----------         ------                     CBC W/ DIFFERENTIAL[[id number]]                               Final result                 Please view results for these tests on the individual orders.   CBC W/ DIFFERENTIAL     EKG    Rate, intervals and axes as noted on EKG Report.  Rate: 87   Rhythm: Sinus Rhythm  Reading: NSR no juan pablo no ectopy  Stable condition  Stable 02/16/24                         [facility]               Medical Decision Making  29yo/f w hx and exam as stated c/o chest pain, syncope    No focal deficits  Normal neuro exam  Cxr wnl  EKG unchanged  No swelling  No vomiting  Overall stable  No head, neck, back pain    Discussed with sandy Chapa patient in the ED, will consult  Discussed with Dr. Martin who will admit    Problems Addressed:  Syncope and collapse: acute illness or injury    Amount and/or Complexity of Data Reviewed  Labs:  Decision-making details documented in ED Course.  Radiology:  Decision-making details documented in ED Course.    Risk  OTC drugs.  Prescription drug management.        Disposition and Plan     Clinical Impression:  1. Syncope and collapse         Disposition:  Admit  3/13/2024  6:29 pm    Follow-up:  No follow-up provider specified.        Medications Prescribed:  Current Discharge Medication List                            Hospital Problems       Present on Admission  Date Reviewed: 2/28/2024            ICD-10-CM Noted POA    * (Principal) Syncope and collapse R55 3/13/2024 Unknown

## 2024-03-13 NOTE — ED QUICK NOTES
Orders for admission, patient is aware of plan and ready to go upstairs. Any questions, please call ED HERNAN Concepcion at extension [phone].     Patient Covid vaccination status: Unvaccinated     COVID Test Ordered in ED: None    COVID Suspicion at Admission: N/A    Running Infusions:  None    Mental Status/LOC at time of transport: A&Ox4    Other pertinent information:   CIWA score: N/A   NIH score:  N/A

## 2024-03-13 NOTE — HISTORICAL OFFICE NOTE
Continuity of Care Document  3/12/2024  Concepcion Alvarado - 28 y.o. Female; born [birth year]Summary of episode note, generated on Mar. 13, 2024March 13, 2024   CHIEF COMPLAINT    CHIEF COMPLAINT  Reason for Visit/Chief Complaint   f/u   Patient is here for follow-up appointment. She continues to have shortness of breath at rest. She had a VQ scan done which showed low probability for PE. She had right heart cath, coronary angiogram, 2D echocardiogram done all of which have been unremarkable. She does have dilated RV with enlarged pulmonary artery. She has been ruled out for sleep apnea as per the patient with a sleep study.     PROBLEMS  Reconcile with Patient's ChartPROBLEMS  Problem Effective Dates Date resolved Problem Status   Palpitations, [SNOMED-CT: 46634753] 2/20/2024 - Active   Obesity, morbid, with BMI of 45.0-49.9, adult, [SNOMED-CT: 408935145] 2/20/2024 - Active   Dyspnea on exertion, [SNOMED-CT: 30146240] 2/20/2024 - Active     ENCOUNTER DIAGNOSIS    ENCOUNTER DIAGNOSIS  Problem Effective Dates Date resolved Problem Status   Palpitations, [SNOMED-CT: 40702267] 2/20/2024 - Active   Obesity, morbid, with BMI of 45.0-49.9, adult, [SNOMED-CT: 728379635] 2/20/2024 - Active   Dyspnea on exertion, [SNOMED-CT: 68399347] 2/20/2024 - Active     VITAL SIGNS    VITAL SIGNS  Date / Time: 3/12/2024   BP Systolic 122 mmHg   BP Diastolic 70 mmHg   Height 66 inches   Weight 350 lbs   Pulse Rate 66 bpm   BSA (Body Surface Area) 2.8 cc/m2   BMI (Body Mass Index) 56.5 cc/m2   Blood Pressure 122 / 70 mmHg     PHYSICAL EXAMINATION    PHYSICAL EXAMINATION  Header Details   Constitutional 96o2%   Vitals Left Arm Sitting  / 70 mmHg, Pulse rate 66 bpm, Regular, Height in 5' 6\", BMI: 56.5, Weight in 350.53 lbs (or) 159 kgs, BSA : 2.82 cc/m²   Head/Eyes/Ears/Nose/Mouth/Throat EOMI, PERRLA   Neck No carotid bruits, No JVD   Respiratory Unlabored, Lungs clear with normal breath sounds, Equal bilaterally    Cardiovascular Regular rhythm. Normal and normal S1 and S2   Gastrointestinal Abdomen soft, Non-tender   Gait Normal gait   Upper Extremities No clubbing, No cyanosis, No edema   Lower Extremities Pulses 2+ and equal bilaterally   Skin Warm and dry     ALLERGIES, ADVERSE REACTIONS, ALERTS    No data available    MEDICATIONS ADMINISTERED DURING VISIT    No data available    MEDICATIONS  Reconcile with Patient's ChartMEDICATIONS  Medication Start Date Route/Frequency Status   Ozempic 0.25 mg or 0.5 mg (2 mg/3 mL) subcutaneous pen injector, [RxNorm: 3913055] 3/12/2024 (subcutaneous) once a week Active     ASSESSMENT    Continue current medications  Cardiac MRI with gadolinium to rule out primary problem with the RV versus congenital heart disease for RV dilatation  Follow-up with me after above testing completed.Increased BMI: Provide patient with information regarding diet and lifestyle changes.     FAMILY HISTORY    No data available    GENERAL STATUS    No data available    PAST MEDICAL HISTORY    PAST MEDICAL HISTORY  Problem Date diagonsed Date resolved Status   Palpitations, [SNOMED-CT: 97528817] 2/20/2024 - Active   Obesity, morbid, with BMI of 45.0-49.9, adult, [SNOMED-CT: 858913810] 2/20/2024 - Active   Dyspnea on exertion, [SNOMED-CT: 23141898] 2/20/2024 - Active     HISTORY OF PRESENT ILLNESS    Patient is here for follow-up appointment. She continues to have shortness of breath at rest. She had a VQ scan done which showed low probability for PE. She had right heart cath, coronary angiogram, 2D echocardiogram done all of which have been unremarkable. She does have dilated RV with enlarged pulmonary artery. She has been ruled out for sleep apnea as per the patient with a sleep study.     IMMUNIZATIONS    No data available    PLAN OF CARE    PLAN OF CARE  Planned Care Date   MRI cardiac morphology w con 1/1/1900   Referral Visit - Verona Jefferson ([email or web address]) : 1/1/1900    Follow up visit - Michael Michel MD 1/1/1900     PROCEDURES    No data available    LAB RESULTS    No data available    REVIEW OF SYSTEMS    REVIEW OF SYSTEMS  Header Details   Eyes No history of Blurry vision, Visual changes, Double vision   Cardiovascular DANEIL, Palpitations  No history of Chest pain, Syncope, PND, Orthopnea, Edema, Claudication   Respiratory No history of SOB, Wheezing, Sputum   Others Review Of Systems   dizziness and lightheadedness     SOCIAL HISTORY    SOCIAL HISTORY  Social History Element Description Effective Dates   Smoking status Never smoked -     FUNCTIONAL STATUS    No data available    INSTRUCTIONS    INSTRUCTIONS  NAME TYPE DATE   Increased BMI: Provide patient with information regarding diet and lifestyle changes. Goals 3/12/2024     MEDICAL EQUIPMENT    No data available    Goals Sections    No data available    REASON FOR REFERRAL             Health Concerns Section    No data available    COGNITIVE/MENTAL STATUS    No data available    Patient Demographics    Patient Demographics  Patient Address Communication Language Race / Ethnicity Marital Status   [address] [place] [address] [phone] (Mobile) English - Spoken (Preferred) Black or  / Not  or       Document Information    Primary Care Provider Other Service Providers Document Coverage Dates   Michael Michel  NPI: [id number]  [phone] (Work)  [address], [address]  [place] [address]  [place] [address]  Interpreting Physicians  [facility]  [phone] (Work)  [address] [place] [address] Sakshi Hernandez  NPI: [id number]  [phone] (Work)  [address], [address] [place] [address]  [place] [address]  Nurses     Lis Baxter  NPI: [id number]  [phone] (Work)  [address], [address] [place] [address]  [place] [address]  Nurses     Agnes Galicia  NPI:  [id number]  [phone] (Work)  [address] [facility], [address] [place] [address]  [place] [address]  Nurses     Radha WASHINGTONRAQUEL Lamar  NPI: [id number]  [phone] (Work)  [address] [facility] [facility], [address] [place] [address]  [place] [address]  Nurses Mar. 12, 2024March 12, 2024      Organization   [facility]  [phone] (Work)  [address], [address] [place] [address]  [place] [address]     Encounter Providers Encounter Date    Mar. 12, 2024March 12, 2024     Legal Authenticator    Michael Michel  NPI: [id number]  [phone] (Work)  [address] [facility], [address] [place] [address]  [place] [address]

## 2024-03-13 NOTE — IMAGING NOTE
Ambulatory Telemetry Monitor Report      1. This is an excellent quality study.   2. Predominant rhythm is normal sinus rhythm.   3. The minimum heart rate recorded was 46 beats / minute. The maximum heart rate is 154 beats / minute. The mean heart rate is 83 beats / minute.   4. No evidence of AV block is noted.   5. Rare premature atrial contractions noted.   6. Rare premature ventricular contractions noted.    7. No evidence of ventricular tachycardia is noted.  8. No pauses were noted.   9. Sinus  bpm  PVC's rare  PAC's 0.7%  5 atrial runs up to 3 seconds and 109 bpm  Palpitations during sinus tachycardia

## 2024-03-13 NOTE — ED INITIAL ASSESSMENT (HPI)
Patient arrived in wheelchair to ED, A/O x 4, with complaints of syncopal episode last night.    Patient states that she stood up from bed, felt lightheaded, had syncopal episode. Husbands stated that patient gently fell to floor. Patient denies any injury or pain at this time. Patient also reports SOB x1 month

## 2024-03-13 NOTE — CONSULTS
Chief (consult dictated)    Assessment:  1.  Syncope.  5-minute loss of consciousness, witnessed.  No seizure activity and no incontinence.    2.  Chronic dyspnea on exertion and dizziness.  Patient has had an extensive outpatient workup, most of which is unavailable because it was done at [facility] in [place].    3.  Morbid obesity apparently had negative sleep study.    4.  Hypertension based on home blood pressure readings      Plan:  1.  Admit for overnight observation.    2.  Cardiac MRI in the morning.    3.  Cardiopulmonary exercise test as outpatient.    4.  Obtain outside records for review.    5.  Initiate antihypertensive medication if pressures remain high.      Thank you

## 2024-03-14 ENCOUNTER — APPOINTMENT (OUTPATIENT)
Dept: MRI IMAGING | Facility: HOSPITAL | Age: 29
End: 2024-03-14
Attending: INTERNAL MEDICINE
Payer: COMMERCIAL

## 2024-03-14 ENCOUNTER — APPOINTMENT (OUTPATIENT)
Dept: PICC SERVICES | Facility: HOSPITAL | Age: 29
End: 2024-03-14
Attending: HOSPITALIST
Payer: COMMERCIAL

## 2024-03-14 LAB
ANION GAP SERPL CALC-SCNC: 6 MMOL/L (ref 0–18)
ATRIAL RATE: 87 BPM
BASOPHILS # BLD AUTO: 0.02 X10(3) UL (ref 0–0.2)
BASOPHILS NFR BLD AUTO: 0.4 %
BUN BLD-MCNC: 13 MG/DL (ref 9–23)
BUN/CREAT SERPL: 18.3 (ref 10–20)
CALCIUM BLD-MCNC: 9.7 MG/DL (ref 8.7–10.4)
CHLORIDE SERPL-SCNC: 105 MMOL/L (ref 98–112)
CO2 SERPL-SCNC: 28 MMOL/L (ref 21–32)
CREAT BLD-MCNC: 0.71 MG/DL
DEPRECATED RDW RBC AUTO: 44.3 FL (ref 35.1–46.3)
EGFRCR SERPLBLD CKD-EPI 2021: 119 ML/MIN/1.73M2 (ref 60–?)
EOSINOPHIL # BLD AUTO: 0.13 X10(3) UL (ref 0–0.7)
EOSINOPHIL NFR BLD AUTO: 2.4 %
ERYTHROCYTE [DISTWIDTH] IN BLOOD BY AUTOMATED COUNT: 13.6 % (ref 11–15)
GLUCOSE BLD-MCNC: 99 MG/DL (ref 70–99)
HCT VFR BLD AUTO: 36.8 %
HGB BLD-MCNC: 12 G/DL
IMM GRANULOCYTES # BLD AUTO: 0.01 X10(3) UL (ref 0–1)
IMM GRANULOCYTES NFR BLD: 0.2 %
LYMPHOCYTES # BLD AUTO: 2.49 X10(3) UL (ref 1–4)
LYMPHOCYTES NFR BLD AUTO: 46.5 %
MCH RBC QN AUTO: 28.9 PG (ref 26–34)
MCHC RBC AUTO-ENTMCNC: 32.6 G/DL (ref 31–37)
MCV RBC AUTO: 88.7 FL
MONOCYTES # BLD AUTO: 0.36 X10(3) UL (ref 0.1–1)
MONOCYTES NFR BLD AUTO: 6.7 %
NEUTROPHILS # BLD AUTO: 2.35 X10 (3) UL (ref 1.5–7.7)
NEUTROPHILS # BLD AUTO: 2.35 X10(3) UL (ref 1.5–7.7)
NEUTROPHILS NFR BLD AUTO: 43.8 %
OSMOLALITY SERPL CALC.SUM OF ELEC: 288 MOSM/KG (ref 275–295)
P AXIS: 52 DEGREES
P-R INTERVAL: 154 MS
PLATELET # BLD AUTO: 311 10(3)UL (ref 150–450)
POTASSIUM SERPL-SCNC: 3.9 MMOL/L (ref 3.5–5.1)
Q-T INTERVAL: 392 MS
QRS DURATION: 86 MS
QTC CALCULATION (BEZET): 471 MS
R AXIS: -15 DEGREES
RBC # BLD AUTO: 4.15 X10(6)UL
SODIUM SERPL-SCNC: 139 MMOL/L (ref 136–145)
T AXIS: 54 DEGREES
VENTRICULAR RATE: 87 BPM
WBC # BLD AUTO: 5.4 X10(3) UL (ref 4–11)

## 2024-03-14 PROCEDURE — 99233 SBSQ HOSP IP/OBS HIGH 50: CPT | Performed by: HOSPITALIST

## 2024-03-14 NOTE — CONSULTS
[facility]    PATIENT'S NAME: STEPHEN SCHRADER   ATTENDING PHYSICIAN: Rony Delgado MD   CONSULTING PHYSICIAN: Rony Delgado MD   PATIENT ACCOUNT#:   [id number]    LOCATION:  [address]  MEDICAL RECORD #:   [id number]       YOB: 1995  ADMISSION DATE:       03/13/2024      CONSULT DATE:  03/13/2024    REPORT OF CONSULTATION      HISTORY OF PRESENT ILLNESS:  This is a pleasant 28-year-old female with a history of dyspnea on exertion and dizziness going back several months.  She saw my partner, Dr. Michel, yesterday for an outpatient visit.  Dyspnea occurs both at rest and with exertion.  She has had outpatient testing including a CT angiogram of the lungs, which did not show a pulmonary embolism.  She had a right heart catheterization which showed normal pulmonary pressures.  A 2D echo apparently showed right ventricular enlargement, which prompted to look for pulmonary hypertension, chronic thromboembolic disease, etc.  She also saw a pulmonologist who did pulmonary function test.  Those results are not available.  She had palpitations and wore a 2-week MCT which was entirely normal.    MEDICATIONS:  Her only medication is Ozempic.    ALLERGIES:  None known.     SOCIAL HISTORY:  She is a nonsmoker, nondrinker.  She is , and her  and mother were with her today.    PHYSICAL EXAMINATION:    GENERAL:  Well-developed, well-nourished, overweight female in no acute distress.  Alert and oriented x3.  VITAL SIGNS:  Blood pressure 161/95 and then 125/83; respirations 16; pulse 89, regular rhythm; afebrile; 98% saturation on room air, at the time she was dyspneic.   HEENT:  Unremarkable.  NECK:  Supple.  Difficult to assess neck veins.  Carotids are brisk without bruits.  No thyromegaly.  LUNGS:  Clear.  HEART:  S1, S2 normal.  No gallop, murmur, rub, or click.  ABDOMEN:  Benign.  EXTREMITIES:  Warm and dry.  Good pulses.  No edema.     LABORATORY DATA:  Basic metabolic panel,  troponin, CBC, chest x-ray, and EKG are all normal.    ASSESSMENT:  Chronic dyspnea on exertion and dizziness.  Thus far, the workup has not uncovered the cause.  Plan was to do a cardiac MRI.  Would keep in-house for observation overnight on telemetry given her loss of consciousness today and get a cardiac MRI in the morning.    PLAN:    1.   Admit for overnight observation.  2.   Cardiac MRI in the morning.  3.   Cardiopulmonary exercise test as an outpatient.    4.    Obtain complete outside records for review.  5.   Initiate antihypertensive medication if pressures remain high.    Thank you for this consultation.    Dictated By Rony Delgado MD  d: 03/13/2024 18:30:25  t: 03/13/2024 18:47:36  Job [id number]  [facility]/

## 2024-03-14 NOTE — PROGRESS NOTES
[facility]  part of [facility]    Progress Note    Concepcion Alvarado Patient Status:  Observation    1995 MRN [id number]   Location [facility] 3W/SW Attending Jw Ferrer MD   Hosp Day # 0 PCP Verona Jeffreson CMA       Subjective:   Concepcion Alvarado is a(n) 28 year old female was seen and examined  No acute events overnight  Remains orthostatic  No sob or cp at rest  No dizziness or blurry vision  Mother at bedside     Objective:   Blood pressure (!) 122/93, pulse 80, temperature 97.6 °F (36.4 °C), temperature source Oral, resp. rate 16, height 5' 6\" (1.676 m), weight (!) 343 lb (155.6 kg), SpO2 98%.    GENERAL:  Alert and oriented to time, place and person.  No acute distress.   HEENT:  Atraumatic.  Oropharynx clear.  Dry mucous membranes.  Normal hard and soft palate.  Eyes:  Anicteric sclerae.    NECK:  Supple.  No lymphadenopathy.  Trachea midline.  Full range of motion.   LUNGS:  Clear to auscultation bilaterally.  Normal respiratory effort.    HEART:  Regular rate and rhythm.  S1 and S2 auscultated.  No murmur.    ABDOMEN:  Soft, nondistended.  No tenderness.  Positive bowel sounds.   EXTREMITIES:  No edema, clubbing or cyanosis.   NEUROLOGIC:  Motor and sensory intact.  Cranial nerves II to XII are intact.      Current Inpatient Medications:     Current Facility-Administered Medications:     sodium chloride 0.9% infusion, , Intravenous, Continuous    acetaminophen (Tylenol Extra Strength) tab 500 mg, 500 mg, Oral, Q4H PRN    ondansetron (Zofran) 4 MG/2ML injection 4 mg, 4 mg, Intravenous, Q6H PRN    prochlorperazine (Compazine) 10 MG/2ML injection 5 mg, 5 mg, Intravenous, Q8H PRN    temazepam (Restoril) cap 15 mg, 15 mg, Oral, Nightly PRN    enoxaparin (Lovenox) 80 MG/0.8ML SUBQ injection 80 mg, 0.5 mg/kg, Subcutaneous, Daily    Assessment and Plan:   1.       Syncope, possible orthostatic.    Recently started on Ozempic and possible poor oral intake.    Underwent  extensive work up as OP including angiogram, R heart cath, 2d echo all which were unremarkable  Cards has been consulted  Cont IVF  Cardiac MRI ordered and pending  Will also need exercise stress test as OP  Will check AM cortisol   TSH WNL    2.       Essential hypertension.  BP stable    3.       Morbid obesity.  Cont ozempic  Counseled on diet and lifestyle modification    DVT Ppx: Lovenox  Full code    MDM: High     Results:     Recent Labs   Lab 03/13/24  1602 03/14/24  0621   RBC 4.65 4.15   HGB 13.0 12.0   HCT 41.4 36.8   MCV 89.0 88.7   MCH 28.0 28.9   MCHC 31.4 32.6   RDW 13.8 13.6   NEPRELIM 2.24 2.35   WBC 5.3 5.4   .0 311.0         Recent Labs   Lab 03/12/24  1038 03/13/24  1602 03/14/24  0621   GLU 90 83 99   BUN 11 11 13   CREATSERUM 0.66 0.70 0.71   EGFRCR 123 121 119   CA 9.8 10.1 9.7    136 139   K 3.9 3.9 3.9    108 105   CO2 28.0 29.0 28.0         Imaging:   XR CHEST AP PORTABLE  (CPT=71045)    Result Date: 3/13/2024  CONCLUSION:   Negative for radiographically evident acute intrathoracic process.  Dictated by (CST): Anthony Forte MD on 3/13/2024 at 4:33 PM     Finalized by (CST): Anthony Forte MD on 3/13/2024 at 4:34 PM         EKG 12 Lead    Result Date: 3/14/2024  Normal sinus rhythm Cannot rule out Anterior infarct , age undetermined Abnormal ECG When compared with ECG of 16-FEB-2024 21:18, Questionable change in The axis Confirmed by AKTIE PEREZ ([id number]) on 3/14/2024 7:43:51 AM       Jw Ferrer MD  3/14/2024

## 2024-03-14 NOTE — PLAN OF CARE
IV fluids.  Will be getting cardiac MRI.  Already worked up as outpt at [facility].     Problem: CARDIOVASCULAR - ADULT  Goal: Maintains optimal cardiac output and hemodynamic stability  Description: INTERVENTIONS:  - Monitor vital signs, rhythm, and trends  - Monitor for bleeding, hypotension and signs of decreased cardiac output  - Evaluate effectiveness of vasoactive medications to optimize hemodynamic stability  - Monitor arterial and/or venous puncture sites for bleeding and/or hematoma  - Assess quality of pulses, skin color and temperature  - Assess for signs of decreased coronary artery perfusion - ex. Angina  - Evaluate fluid balance, assess for edema, trend weights  Outcome: Not Progressing  Goal: Absence of cardiac arrhythmias or at baseline  Description: INTERVENTIONS:  - Continuous cardiac monitoring, monitor vital signs, obtain 12 lead EKG if indicated  - Evaluate effectiveness of antiarrhythmic and heart rate control medications as ordered  - Initiate emergency measures for life threatening arrhythmias  - Monitor electrolytes and administer replacement therapy as ordered  Outcome: Not Progressing

## 2024-03-14 NOTE — H&P
[facility]    PATIENT'S NAME: STEPHEN SCHRADER   ATTENDING PHYSICIAN: Keven Martin MD   PATIENT ACCOUNT#:   [id number]    LOCATION:  [address]  MEDICAL RECORD #:   [id number]       YOB: 1995  ADMISSION DATE:       03/13/2024    HISTORY AND PHYSICAL EXAMINATION    CHIEF COMPLAINT:  Syncope.    HISTORY OF PRESENT ILLNESS:  Patient is a 28-year-old  female with history of morbid obesity and recent progressive dyspnea on exertion.  Has been evaluated by Cardiology and had an extensive workup including cardiac angiogram, right heart catheterization, echocardiogram, which were all unremarkable.  She was referred for pulmonary evaluation and had pulmonary function tests ordered as an outpatient.  Today, she came into the emergency department after she had a syncopal episode around 2 a.m. yesterday.  In the emergency room, CBC, chemistry, troponin, and EKG were unremarkable.  Chest x-ray showed no acute findings.     PAST MEDICAL HISTORY:  Morbid obesity.    PAST SURGICAL HISTORY:  Tonsillectomy.    MEDICATIONS:  Patient said she was started on Ozempic , took the first dose 3 days ago.  She has been feeling some decreased appetite.      ALLERGIES:  No known drug allergies.    FAMILY HISTORY:  Positive for hypertension and diabetes mellitus type 2.    SOCIAL HISTORY:  No tobacco or drug use.  Rare alcohol use.  Lives with her family.      REVIEW OF SYSTEMS:  Patient reported that yesterday she sat at the edge of the bed and felt lightheaded and dizzy.  She tried to stand up.  The dizziness got worse.  She sat down again, tried again, and then she felt dizzy and she had a syncopal episode.  She did not hit her head completely.  Patient is currently without dizziness, but she continued to complain about shortness of breath.  No recent fever, chills, cough or chest pain.  Other 12-point review of systems is negative.        PHYSICAL EXAMINATION:    GENERAL:  Alert and oriented  to time, place and person.  No acute distress.   VITAL SIGNS:  Temperature 97.0, pulse 89, respiratory rate 16, blood pressure 125/83, pulse ox 98% on room air.   HEENT:  Atraumatic.  Oropharynx clear.  Dry mucous membranes.  Normal hard and soft palate.  Eyes:  Anicteric sclerae.    NECK:  Supple.  No lymphadenopathy.  Trachea midline.  Full range of motion.   LUNGS:  Clear to auscultation bilaterally.  Normal respiratory effort.    HEART:  Regular rate and rhythm.  S1 and S2 auscultated.  No murmur.    ABDOMEN:  Soft, nondistended.  No tenderness.  Positive bowel sounds.   EXTREMITIES:  No edema, clubbing or cyanosis.   NEUROLOGIC:  Motor and sensory intact.  Cranial nerves II to XII are intact.      ASSESSMENT:    1.   Syncope, possible orthostatic.  Recently started on Ozempic and possible poor oral intake.    2.   Essential hypertension.  3.   Morbid obesity.    PLAN:  Patient will be admitted to telemetry floor for observation.  Cardiology consult.  Per cardiology recommendations, patient will need cardiac MRI and cardiopulmonary exercise test as an outpatient.  Continue to monitor rhythm.  Fall precautions.  Start patient on oral antihypertensive medications.  Further recommendations to follow.     Dictated By Keven Martin MD  d: 03/13/2024 18:42:39  t: 03/13/2024 19:09:04  Job [id number]  FB/

## 2024-03-14 NOTE — PLAN OF CARE
Pt alert and oriented x4. Pt on room air. IVF per orders. Pt refused Lovenox. Primary RN gave education packets. Pt ambulating sba. Plan for MRI  Problem: Patient Centered Care  Goal: Patient preferences are identified and integrated in the patient's plan of care  Description: Interventions:  - What would you like us to know as we care for you? From home  - Provide timely, complete, and accurate information to patient/family  - Incorporate patient and family knowledge, values, beliefs, and cultural backgrounds into the planning and delivery of care  - Encourage patient/family to participate in care and decision-making at the level they choose  - Honor patient and family perspectives and choices  Outcome: Progressing     Problem: Patient/Family Goals  Goal: Patient/Family Long Term Goal  Description: Patient's Long Term Goal: Discharge home     Interventions:  - assess vs, assess pain, and sob  - See additional Care Plan goals for specific interventions  Outcome: Progressing  Goal: Patient/Family Short Term Goal  Description: Patient's Short Term Goal: Assess sob, [place] education    Interventions:   - assess sob with ambulation health and med education   - See additional Care Plan goals for specific interventions  Outcome: Progressing     Problem: CARDIOVASCULAR - ADULT  Goal: Maintains optimal cardiac output and hemodynamic stability  Description: INTERVENTIONS:  - Monitor vital signs, rhythm, and trends  - Monitor for bleeding, hypotension and signs of decreased cardiac output  - Evaluate effectiveness of vasoactive medications to optimize hemodynamic stability  - Monitor arterial and/or venous puncture sites for bleeding and/or hematoma  - Assess quality of pulses, skin color and temperature  - Assess for signs of decreased coronary artery perfusion - ex. Angina  - Evaluate fluid balance, assess for edema, trend weights  Outcome: Progressing  Goal: Absence of cardiac arrhythmias or at baseline  Description:  INTERVENTIONS:  - Continuous cardiac monitoring, monitor vital signs, obtain 12 lead EKG if indicated  - Evaluate effectiveness of antiarrhythmic and heart rate control medications as ordered  - Initiate emergency measures for life threatening arrhythmias  - Monitor electrolytes and administer replacement therapy as ordered  Outcome: Progressing

## 2024-03-14 NOTE — PROGRESS NOTES
Progress Note  Concepcion Alvarado Patient Status:  Observation    1995 MRN [id number]   Location [facility] 3W/SW Attending Jw Ferrer MD   Hosp Day # 0 PCP Verona Jefferson CMA     Primary Cardiologist: Marilu     SUBJECTIVE:    Reports ongoing dyspnea that has been recurrent over the past few months. Denies chest pain now or ever having chest pain. There are no alleviating or relieving factors.  She reports that she is concerned she may have POTS, reports occasional palpitations but has not noted if it is concurrent with positional changes.  She reports that it happens randomly    VITALS:  BP (!) 122/93 (BP Location: Right arm)   Pulse 80   Temp 97.6 °F (36.4 °C) (Oral)   Resp 16   Ht 5' 6\" (1.676 m)   Wt (!) 343 lb (155.6 kg)   LMP  (Within Months)   SpO2 98%   BMI 55.36 kg/m²     INTAKE/OUTPUT:  No intake or output data in the 24 hours ending 24 1245  Last 3 Weights   24 0348 (!) 343 lb (155.6 kg)   24 1325 (!) 350 lb (158.8 kg)   24 2031 (!) 345 lb (156.5 kg)   24 1331 (!) 357 lb (161.9 kg)     LABS:  Recent Labs   Lab 24  1038 24  1602 24  0621   GLU 90 83 99   BUN 11 11 13   CREATSERUM 0.66 0.70 0.71   EGFRCR 123 121 119   CA 9.8 10.1 9.7    136 139   K 3.9 3.9 3.9    108 105   CO2 28.0 29.0 28.0     Recent Labs   Lab 24  1602 24  0621   RBC 4.65 4.15   HGB 13.0 12.0   HCT 41.4 36.8   MCV 89.0 88.7   MCH 28.0 28.9   MCHC 31.4 32.6   RDW 13.8 13.6   NEPRELIM 2.24 2.35   WBC 5.3 5.4   .0 311.0     No results for input(s): \"TROP\", \"CK\" in the last 168 hours.    DIAGNOSTICS:    TELEMETRY: Sinus bradycardia sinus rhythm heart rate 59 to 70s    Ambulatory Telemetry Monitor Report   1.     This is an excellent quality study.   2.     Predominant rhythm is normal sinus rhythm.   3.     The minimum heart rate recorded was 46 beats / minute. The maximum heart rate is 154 beats / minute. The mean heart rate is  83 beats / minute.   4.      No evidence of AV block is noted.   5.      Rare premature atrial contractions noted.   6.      Rare premature ventricular contractions noted.    7.       No evidence of ventricular tachycardia is noted.  8.       No pauses were noted.   9.       Sinus  bpm  PVC's rare  PAC's 0.7%  5 atrial runs up to 3 seconds and 109 bpm  Palpitations during sinus tachycardia     ROS: Negative unless noted above     PHYSICAL EXAM:  General: Alert and oriented x 3. No apparent distress.  HEENT: Normocephalic, sclera are nonicteric. Hearing appropriate bilaterally.  Neck: No JVD or Carotid bruits. Trachea midline.   Cardiac: Regular rate and rhythm. S1, S2 auscultated. No murmurs, rubs, or gallops appreciated.   Lungs: Clear without wheezes, rales, rhonchi or dullness. Chest expansion symmetrical. Regular effort.  Abdomen: Soft, non-tender, +BS. No hepatosplenomegaly or appreciable masses.   Extremities: Without clubbing, cyanosis or edema.  Peripheral pulses are 2+.  Neurologic: Motor and sensory nerves grossly intact.   Psych: Appropriate affect   Skin: Warm and dry. No obvious lesions, wounds, or ulcerations.     MEDICATIONS:   enoxaparin  0.5 mg/kg Subcutaneous Daily      sodium chloride 100 mL/hr at 03/14/24 0615     ASSESSMENT:    Witnessed Syncope  - Lasted ~ 5 minutes, no seizure activity or incontinence   - Orthostatics this morning were positive but patient was asymptomatic, Blood pressures still remained elevated   - TSH in February was slightly elevated but T4 was normal, EKG without acute findings  - No arrhythmias on bedside telemetry    Intermittent Palpitations   Chronic dyspnea  - Not progressive  -Outpatient and remote workup thus far included an echocardiogram which was normal with the exception of some right ventricular enlargement which prompted which prompted a right heart cath revealing revealing normal pressures.  She also had an outpatient mobile cardiac telemetry monitor  which was without abnormalities.  She is also morbidly obese therefore a sleep study was recommended that she was negative for sleep apnea    Morbid Obesity   Type II DM- A1c 6.5%, per primary     PLAN:  -Unclear etiology of syncopal event yesterday.  Chronic since symptoms have been evaluated as above, plan is to obtain a cardiac MRI today to rule out right ventricular dysplasia    Plan of care discussed with patient and RN.     Qiana Barber, APRN  3/14/2024  12:45 PM  [phone] ([place])  [phone] ([place])          =======================================================  Patient seen and examined independently.  Note reviewed and labs reviewed.  Agree with above assessment and plan except as noted below.      Assessment:  Syncope  Chronic shortness of breath  Obesity      Plan:  No arrhythmias on the monitor for last 23 hours  Multiple outpatient tests including coronary angiogram/right heart clot/sleep apnea study/CT scan/PFTs/echocardiogram/stress test have all been unremarkable  From cardiac standpoint okay to discharge home, cardiac MRI can be done as an outpatient  MCT for 2 weeks upon discharge  Follow-up with me in 3 weeks or sooner if needed          Michael Michel MD        3

## 2024-03-15 ENCOUNTER — APPOINTMENT (OUTPATIENT)
Dept: MRI IMAGING | Facility: HOSPITAL | Age: 29
End: 2024-03-15
Attending: INTERNAL MEDICINE
Payer: COMMERCIAL

## 2024-03-15 VITALS
TEMPERATURE: 98 F | OXYGEN SATURATION: 99 % | DIASTOLIC BLOOD PRESSURE: 98 MMHG | HEART RATE: 78 BPM | SYSTOLIC BLOOD PRESSURE: 148 MMHG | HEIGHT: 66 IN | RESPIRATION RATE: 18 BRPM | BODY MASS INDEX: 47.09 KG/M2 | WEIGHT: 293 LBS

## 2024-03-15 LAB — CORTIS SERPL-MCNC: 7.6 UG/DL

## 2024-03-15 PROCEDURE — 99239 HOSP IP/OBS DSCHRG MGMT >30: CPT | Performed by: HOSPITALIST

## 2024-03-15 NOTE — PLAN OF CARE
Patient medically cleared for discharge. Tele and IV removed. Discharge education completed.   Problem: Patient Centered Care  Goal: Patient preferences are identified and integrated in the patient's plan of care  Description: Interventions:  - What would you like us to know as we care for you? From home  - Provide timely, complete, and accurate information to patient/family  - Incorporate patient and family knowledge, values, beliefs, and cultural backgrounds into the planning and delivery of care  - Encourage patient/family to participate in care and decision-making at the level they choose  - Honor patient and family perspectives and choices  Outcome: Completed     Problem: Patient/Family Goals  Goal: Patient/Family Long Term Goal  Description: Patient's Long Term Goal: Discharge home     Interventions:  - assess vs, assess pain, and sob  - See additional Care Plan goals for specific interventions  Outcome: Completed  Goal: Patient/Family Short Term Goal  Description: Patient's Short Term Goal: Assess sob, [place] education    Interventions:   - assess sob with ambulation health and med education   - See additional Care Plan goals for specific interventions  Outcome: Completed     Problem: CARDIOVASCULAR - ADULT  Goal: Maintains optimal cardiac output and hemodynamic stability  Description: INTERVENTIONS:  - Monitor vital signs, rhythm, and trends  - Monitor for bleeding, hypotension and signs of decreased cardiac output  - Evaluate effectiveness of vasoactive medications to optimize hemodynamic stability  - Monitor arterial and/or venous puncture sites for bleeding and/or hematoma  - Assess quality of pulses, skin color and temperature  - Assess for signs of decreased coronary artery perfusion - ex. Angina  - Evaluate fluid balance, assess for edema, trend weights  Outcome: Completed  Goal: Absence of cardiac arrhythmias or at baseline  Description: INTERVENTIONS:  - Continuous cardiac monitoring, monitor vital  signs, obtain 12 lead EKG if indicated  - Evaluate effectiveness of antiarrhythmic and heart rate control medications as ordered  - Initiate emergency measures for life threatening arrhythmias  - Monitor electrolytes and administer replacement therapy as ordered  Outcome: Completed

## 2024-03-15 NOTE — DISCHARGE SUMMARY
[facility]  part of [facility]    Discharge Summary    Concepcion Alvarado Patient Status:  Observation    1995 MRN [id number]   Location [facility] 3W/SW Attending Jw Ferrer MD   Hosp Day # 0 PCP Verona Jefferson CMA     Date of Admission: 3/13/2024 Disposition: Home or Self Care     Date of Discharge: 3/15/24    Admitting Diagnosis: Syncope and collapse [R55]    Hospital Discharge Diagnoses: Syncope, dizziness, morbid obesity    Lace+ Score: 25  59-90 High Risk  29-58 Medium Risk  0-28   Low Risk.    TCM Follow-Up Recommendation:  LACE < 29: Low Risk of readmission after discharge from the hospital; Still recommend for TCM follow-up.    Problem List:   Patient Active Problem List   Diagnosis    Shortness of breath    Syncope and collapse       Reason for Admission: Syncope    Physical Exam:   Vitals:    03/15/24 1231   BP:    Pulse: 78   Resp:    Temp:    GENERAL:  Alert and oriented to time, place and person.  No acute distress.   HEENT:  Atraumatic.  Oropharynx clear.  Dry mucous membranes.  Normal hard and soft palate.  Eyes:  Anicteric sclerae.    NECK:  Supple.  No lymphadenopathy.  Trachea midline.  Full range of motion.   LUNGS:  Clear to auscultation bilaterally.  Normal respiratory effort.    HEART:  Regular rate and rhythm.  S1 and S2 auscultated.  No murmur.    ABDOMEN:  Soft, nondistended.  No tenderness.  Positive bowel sounds.   EXTREMITIES:  No edema, clubbing or cyanosis.   NEUROLOGIC:  Motor and sensory intact.  Cranial nerves II to XII are intact.      History of Present Illness:   Per Dr. Martin  Patient is a 28-year-old  female with history of morbid obesity and recent progressive dyspnea on exertion. Has been evaluated by Cardiology and had an extensive workup including cardiac angiogram, right heart catheterization, echocardiogram, which were all unremarkable. She was referred for pulmonary evaluation and had pulmonary function  tests ordered as an outpatient. Today, she came into the emergency department after she had a syncopal episode around 2 a.m. yesterday. In the emergency room, CBC, chemistry, troponin, and EKG were unremarkable. Chest x-ray showed no acute findings.     Hospital Course:     Syncope, possible orthostatic hypotension    Recently started on Ozempic and possible poor oral intake.    Underwent extensive work up as OP including angiogram, R heart cath, 2d echo all which were unremarkable  Cards was consulted  Rec'd IVF, orthostatic hypotension reolved  Cardiac MRI as OP  Will also need exercise stress test as OP  Will check AM cortisol -> Normal  TSH WNL  Stable for dc, pt advised to seek further medical care at tertiary center if symptoms persists    Essential hypertension.  BP stable     Morbid obesity.  Cont ozempic  Counseled on diet and lifestyle modification    Consultations: cardiology    Procedures: none     Complications: none    Discharge Condition: Stable    Discharge Medications:      Discharge Medications        CONTINUE taking these medications        Instructions Prescription details   semaglutide 2 MG/3ML Sopn  Commonly known as: Ozempic  Start taking on: March 18, 2024      Inject 0.25 mg into the skin once a week.   Refills: 0            Greater than 35 minutes spent, >50% spent counseling re: treatment plan and workup     Jw Ferrer MD  3/15/2024

## 2024-03-15 NOTE — PLAN OF CARE
Pt alert and oriented x4. Denies chest pain/SOB. Vitals WNL. On RA. Satting in the high-90s. On IVF 0.9 NS @ 100ml/hr. Denies dizziness. Plan for cardiac MRI today. Plan for home upon discharge pending medical clearance.     0623- Unable to do cardiac MRI due to body habitus. NP on call notified.   Problem: Patient Centered Care  Goal: Patient preferences are identified and integrated in the patient's plan of care  Description: Interventions:  - What would you like us to know as we care for you? From home  - Provide timely, complete, and accurate information to patient/family  - Incorporate patient and family knowledge, values, beliefs, and cultural backgrounds into the planning and delivery of care  - Encourage patient/family to participate in care and decision-making at the level they choose  - Honor patient and family perspectives and choices  Outcome: Progressing     Problem: CARDIOVASCULAR - ADULT  Goal: Maintains optimal cardiac output and hemodynamic stability  Description: INTERVENTIONS:  - Monitor vital signs, rhythm, and trends  - Monitor for bleeding, hypotension and signs of decreased cardiac output  - Evaluate effectiveness of vasoactive medications to optimize hemodynamic stability  - Monitor arterial and/or venous puncture sites for bleeding and/or hematoma  - Assess quality of pulses, skin color and temperature  - Assess for signs of decreased coronary artery perfusion - ex. Angina  - Evaluate fluid balance, assess for edema, trend weights  Outcome: Progressing  Goal: Absence of cardiac arrhythmias or at baseline  Description: INTERVENTIONS:  - Continuous cardiac monitoring, monitor vital signs, obtain 12 lead EKG if indicated  - Evaluate effectiveness of antiarrhythmic and heart rate control medications as ordered  - Initiate emergency measures for life threatening arrhythmias  - Monitor electrolytes and administer replacement therapy as ordered  Outcome: Progressing      chest pain

## 2024-03-15 NOTE — PROGRESS NOTES
Progress Note  Concepcion Alvarado Patient Status:  Observation    1995 MRN [id number]   Location [facility] 3W/SW Attending Jw Ferrer MD   Hosp Day # 0 PCP Verona Jefferson CMA     Subjective:  Pt sitting on the side of the bed  Pt stated the dyspnea has been ongoing and has not improved. Pt stated as long as she is not doing much activities or not talking much there wont be SOB. Denies any chest pain. Denies any palpitation currently but had some palpitation at home.     Objective:  BP (!) 148/98 (BP Location: Left arm)   Pulse 99   Temp 97.9 °F (36.6 °C) (Oral)   Resp 18   Ht 5' 6\" (1.676 m)   Wt (!) 355 lb 8 oz (161.3 kg)   LMP  (Within Months)   SpO2 99%   BMI 57.38 kg/m²     Telemetry: SB with HR 58      Intake/Output:    Intake/Output Summary (Last 24 hours) at 3/15/2024 0924  Last data filed at 3/14/2024 1917  Gross per 24 hour   Intake 1200 ml   Output --   Net 1200 ml       Last 3 Weights   03/15/24 0500 (!) 355 lb 8 oz (161.3 kg)   24 0348 (!) 343 lb (155.6 kg)   24 1325 (!) 350 lb (158.8 kg)   24 (!) 345 lb (156.5 kg)   24 1331 (!) 357 lb (161.9 kg)       Labs:  Recent Labs   Lab 24  1038 24  1602 24  0621   GLU 90 83 99   BUN 11 11 13   CREATSERUM 0.66 0.70 0.71   EGFRCR 123 121 119   CA 9.8 10.1 9.7    136 139   K 3.9 3.9 3.9    108 105   CO2 28.0 29.0 28.0     Recent Labs   Lab 24  1602 24  0621   RBC 4.65 4.15   HGB 13.0 12.0   HCT 41.4 36.8   MCV 89.0 88.7   MCH 28.0 28.9   MCHC 31.4 32.6   RDW 13.8 13.6   NEPRELIM 2.24 2.35   WBC 5.3 5.4   .0 311.0         Recent Labs   Lab 24  1602   TROPHS 3     No results found for: \"PT\", \"INR\"    Diagnostics:       Review of Systems   Respiratory:  Positive for shortness of breath. Negative for cough, hemoptysis, sputum production and wheezing.    Cardiovascular:  Negative for chest pain, palpitations, orthopnea, claudication, leg swelling and  PND.       Physical Exam:    Physical Exam  Vitals reviewed.   Constitutional:       General: She is not in acute distress.     Appearance: She is obese.   HENT:      Head: Normocephalic.   Neck:      Vascular: No JVD.   Cardiovascular:      Rate and Rhythm: Bradycardia present.      Pulses: Normal pulses.      Heart sounds: Normal heart sounds. No murmur heard.     No friction rub. No gallop.   Pulmonary:      Effort: Pulmonary effort is normal. No respiratory distress.      Breath sounds: Normal breath sounds. No stridor. No wheezing, rhonchi or rales.   Abdominal:      General: Abdomen is flat.      Tenderness: There is no abdominal tenderness. There is no guarding.   Musculoskeletal:         General: No swelling or tenderness. Normal range of motion.   Neurological:      Mental Status: She is alert and oriented to person, place, and time.   Psychiatric:         Attention and Perception: Attention normal.         Mood and Affect: Affect is flat.         Medications:   enoxaparin  0.5 mg/kg Subcutaneous Daily      sodium chloride 100 mL/hr at 03/14/24 2760       Assessment/Plan:    Witnessed Syncope  - Lasted ~ 5 minutes, no seizure activity or incontinence   - Orthostatics this morning negative  - pt has been on IVF for dehydration  - TSH in February was slightly elevated but T4 was normal, EKG without acute findings  - No arrhythmias on bedside telemetry     Intermittent Palpitations   Chronic dyspnea  - Not progressive  -Outpatient and remote workup thus far included an echocardiogram which was normal with the exception of some right ventricular enlargement which prompted which prompted a right heart cath revealing revealing normal pressures.  She also had an outpatient mobile cardiac telemetry monitor which was without abnormalities.  She is also morbidly obese therefore a sleep study was recommended that she was negative for sleep apnea    Morbid Obesity   Type II DM- A1c 6.5%, per primary       PLAN:  -Unclear etiology of syncopal event  possibly secondary to dehydration.   - multiple outpatient tests that all been unremarkable  - okay to discharge home from cardiac stand point  - MCT monitor recommended after discharge  Follow up with Dr Michel in 3 weeks    Plan of care discussed with patient and RN.       GIN Maxwell  [facility]  3/15/2024  9:24 AM

## 2024-03-18 ENCOUNTER — OFFICE VISIT (OUTPATIENT)
Dept: ENDOCRINOLOGY CLINIC | Facility: CLINIC | Age: 29
End: 2024-03-18
Payer: COMMERCIAL

## 2024-03-18 VITALS
DIASTOLIC BLOOD PRESSURE: 91 MMHG | HEART RATE: 91 BPM | BODY MASS INDEX: 47.09 KG/M2 | WEIGHT: 293 LBS | HEIGHT: 66 IN | SYSTOLIC BLOOD PRESSURE: 138 MMHG

## 2024-03-18 DIAGNOSIS — E78.2 MIXED HYPERLIPIDEMIA: ICD-10-CM

## 2024-03-18 DIAGNOSIS — I10 PRIMARY HYPERTENSION: ICD-10-CM

## 2024-03-18 DIAGNOSIS — E66.01 CLASS 3 SEVERE OBESITY WITH BODY MASS INDEX (BMI) OF 50.0 TO 59.9 IN ADULT, UNSPECIFIED OBESITY TYPE, UNSPECIFIED WHETHER SERIOUS COMORBIDITY PRESENT (HCC): ICD-10-CM

## 2024-03-18 DIAGNOSIS — E28.2 PCOS (POLYCYSTIC OVARIAN SYNDROME): ICD-10-CM

## 2024-03-18 DIAGNOSIS — E11.65 HYPERGLYCEMIA DUE TO DIABETES MELLITUS (HCC): ICD-10-CM

## 2024-03-18 DIAGNOSIS — M35.9 AUTOIMMUNE DISEASE (HCC): ICD-10-CM

## 2024-03-18 DIAGNOSIS — E07.9 THYROID DISEASE: Primary | ICD-10-CM

## 2024-03-18 DIAGNOSIS — K90.0 CELIAC DISEASE (HCC): ICD-10-CM

## 2024-03-18 DIAGNOSIS — R73.09 HIGH GLUCOSE LEVEL: ICD-10-CM

## 2024-03-18 DIAGNOSIS — R79.89 HIGH SERUM THYROID STIMULATING HORMONE (TSH): ICD-10-CM

## 2024-03-18 RX ORDER — SEMAGLUTIDE 0.68 MG/ML
0.5 INJECTION, SOLUTION SUBCUTANEOUS WEEKLY
Qty: 9 ML | Refills: 1 | Status: SHIPPED | OUTPATIENT
Start: 2024-03-18 | End: 2024-09-14

## 2024-03-18 RX ORDER — METFORMIN HYDROCHLORIDE 500 MG/1
500 TABLET, EXTENDED RELEASE ORAL
Qty: 90 TABLET | Refills: 1 | Status: SHIPPED | OUTPATIENT
Start: 2024-03-18 | End: 2024-09-14

## 2024-03-18 NOTE — PATIENT INSTRUCTIONS
Thyroid  Labs and f/u in 1 year.  Sooner if has new symptoms or planning pregnancy     DM  Annual eye exam   Diabetes educator consult   Start ozempic 0.25 mg /week   Start metformin 500 mg/day   Walk 180 min/week   Repeat A1c in 3 mo and  Follow up with PCP for DM - pt prefers to f/u with PCP

## 2024-03-18 NOTE — PROGRESS NOTES
Reason for Visit:  abnormal TSH .  Requesting Physician:  Verona Jefferson  ..Verona Jefferson [facility]      CHIEF COMPLAINT:    Chief Complaint   Patient presents with    Follow - Up     Thyroid f/u        HISTORY OF PRESENT ILLNESS:   Concepcion Alvarado is a 28 year old female who presents with  high TSH , newly Dx DM,   She has hx of celiac, PCOs and morbid obesity   Was seen with her  today   Saw cardiology for SOB     LOC last week and went to the ER.   Has difficulty in breathing with minimal physical activity   Gets lightheaded     LMP - had implant removed 2/2024  Never been pregnancy   Still gets dry skin, fatigue, wt gain and dizziness with exercise       ASSESSMENT AND PLAN:  29 yo woman with newly Dx DM, high TSH, celiac and morbid obesity.    I Reviewed and discussed her previous w/u     Transient high TSH most likely due to nonthyroidal illness. TSH is normal now with negative TgAb and TPO Ab    She has DM now A1c 6.5% - she denied dx of preDM or hyperglycemia. We discussed her previous labs. She was questioning the dx since she had normal serum glucose in the past     Plan  Thyroid  Labs and f/u in 1 year.  Sooner if has new symptoms or planning pregnancy     DM  Annual eye exam   Diabetes educator consult   Start ozempic 0.25 mg /week   Start metformin 500 mg/day   Walk 180 min/week   Repeat A1c in 3 mo and  Follow up with PCP for DM  Follow up with PCP     PAST MEDICAL HISTORY:   Past Medical History:   Diagnosis Date    ALCOHOL USE     Celiac disease (HCC)     Obesity     PCOS (polycystic ovarian syndrome)      Celiac dz  Pcos  Morbid obesity     PAST SURGICAL HISTORY:   Past Surgical History:   Procedure Laterality Date    TONSILLECTOMY       Tonsillectomy  was 13     CURRENT MEDICATIONS:     metFORMIN  MG Oral Tablet 24 Hr Take 1 tablet (500 mg total) by mouth daily with breakfast. 90 tablet 1    semaglutide (OZEMPIC, 0.25 OR 0.5 MG/DOSE,) 2 MG/3ML Subcutaneous Solution Pen-injector Inject 0.5  mg into the skin once a week. 9 mL 1     No biotin or turmeric         ALLERGIES:  No Known Allergies  None     SOCIAL HISTORY:    Social History     Socioeconomic History    Marital status:    Tobacco Use    Smoking status: Never    Smokeless tobacco: Never   Vaping Use    Vaping Use: Never used   Substance and Sexual Activity    Alcohol use: Yes     Alcohol/week: 3.0 standard drinks of alcohol     Types: 3 Standard drinks or equivalent per week     Comment: during the week    Drug use: Never   Not working now   No smoking   Etoh occ   No drugs     FAMILY HISTORY:   Family History   Problem Relation Age of Onset    Diabetes Mother     Obesity Father       No thyroid disease   Mother with DM            PHYSICAL EXAM:   Height: 5' 6\" (167.6 cm) (03/18 1637)  Weight: 361 lb (163.7 kg) (03/18 1637)  BSA (Calculated - sq m): 2.57 sq meters (03/18 1637)  Pulse: 91 (03/18 1637)  BP: 138/91 (03/18 1637)  Temp: --  Do Not Use - Resp Rate: --  SpO2: --    Body mass index is 58.27 kg/m².    Obese   No goiter   No tremors   No striae     CONSTITUTIONAL:  Awake and alert. Age appropriate, good hygiene not in acute distress. Well nourished and well developed. no acute distress   PSYCH:   Orientated to time, place, person & situation, Normal mood and affect, memory intact, normal insight and judgment, cooperative  Neuro: speech is clear. Awake, alert, no aphasia, no facial asymmetry, no nuchal rigidity  EYES:  No proptosis, no ptosis, conjunctiva normal  ENT:  Normocephalic, atraumatic  Eye: EOMI, normal lids, no discharge, no conjunctival erythema. No exophthalmos/proptosis, Ptosis negative   No rhinorrhea, moist oral mucosa        DATA:     Pertinent data reviewed      Latest Reference Range & Units 03/12/24 10:38   HEMOGLOBIN A1c <5.7 % 6.5 (H)   ESTIMATED AVERAGE GLUCOSE 68 - 126 mg/dL 140 (H)   (H): Data is abnormally high   Latest Reference Range & Units 03/12/24 10:38   T4,Free (Direct) 0.8 - 1.7 ng/dL 1.0   TSH  0.550 - 4.780 mIU/mL 4.441   ANTI-THYROPEROXIDASE <60 U/mL <28      Latest Reference Range & Units 03/12/24 10:38   Thyroglob Ab 0.0 - 0.9 IU/mL <1.0   Thyroglob JANELL 1.5 - 38.5 ng/mL 11.1              No results for input(s): \"TSH\", \"T4F\", \"T3F\", \"THYP\" in the last 72 hours.    No results found.        Orders Placed This Encounter   Procedures    TSH W Reflex To Free T4     Orders Placed This Encounter    TSH W Reflex To Free T4     Standing Status:   Future     Standing Expiration Date:   3/18/2025     Order Specific Question:   Release to patient     Answer:   Immediate    metFORMIN  MG Oral Tablet 24 Hr     Sig: Take 1 tablet (500 mg total) by mouth daily with breakfast.     Dispense:  90 tablet     Refill:  1    semaglutide (OZEMPIC, 0.25 OR 0.5 MG/DOSE,) 2 MG/3ML Subcutaneous Solution Pen-injector     Sig: Inject 0.5 mg into the skin once a week.     Dispense:  9 mL     Refill:  1          This is a specialized patient consultation in endocrinology and required comprehensive review of prior records, as well as current evaluation, with time required for consideration of complex endocrine issues and consultation. For this visit, I personally interviewed the patient, and family member if accompanied, performed the pertinent parts of the history and physical examination. ROS included screening for appropriate endocrine conditions.   Today's diagnosis and plan were reviewed in detail with the patient who states understanding and agrees with plan. I discussed with the patient possible diagnosis, differential diagnosis, need for work up , treatment options, alternatives and side effects.     Please see note for details about time spent which includes:   · pre-visit preparation  · reviewing records  · face to face time with the patient   · timely documentation of the encounter  · ordering medications/tests  · communication with care team  · care coordination    I appreciate the opportunity to be part of your  patient's medical care and will keep you, as the referring and primary physicians, informed about the care of your patient, including possible future surgery and pathology findings. Please feel free to contact me should you have any questions.      Kelin Bang MD

## 2024-03-20 ENCOUNTER — TELEPHONE (OUTPATIENT)
Facility: CLINIC | Age: 29
End: 2024-03-20

## 2024-03-20 NOTE — TELEPHONE ENCOUNTER
Pt completed PFT at [facility] but results not found. Pt indicates she went for the test on 3-9-2024.    Do we know how to obtain these for physician review? Pt is questioning if she should keep her next appointment or if she'll need further testing before the next visit? (Also moved sooner and changed to [facility] per her request)

## 2024-03-20 NOTE — TELEPHONE ENCOUNTER
Pt had PFT at [facility] on 3-9-24.  Assured pt that the results are in her chart but  they have not yet been interpreted by MD.  Informed pt that they will be read and available for appt with Dr. Cody on 4-4-24.

## 2024-03-27 ENCOUNTER — TELEPHONE (OUTPATIENT)
Dept: ENDOCRINOLOGY CLINIC | Facility: CLINIC | Age: 29
End: 2024-03-27

## 2024-03-27 NOTE — TELEPHONE ENCOUNTER
Current Outpatient Medications   Medication Sig Dispense Refill    semaglutide (OZEMPIC, 0.25 OR 0.5 MG/DOSE,) 2 MG/3ML Subcutaneous Solution Pen-injector Inject 0.5 mg into the skin once a week. 9 mL 1     Key:  [id number]

## 2024-03-28 NOTE — TELEPHONE ENCOUNTER
Medication PA Requested:   OZEMPIC, 0.25 OR 0.5 MG/DOSE,) 2 MG/3ML Subcutaneous Solution Pen                                                       CoverMyMeds Used:  Key:  Quantity: 9mL  Day Supply: 90 days  Sig:  Inject 0.5 mg into the skin once a week.   DX Code:    E11.9

## 2024-03-29 NOTE — TELEPHONE ENCOUNTER
Medication PA Requested:   OZEMPIC, 0.25 OR 0.5 MG/DOSE,) 2 MG/3ML Subcutaneous Solution Pen                                                       CoverMyMeds Used: No  Key:  Quantity: 9mL  Day Supply: 90 days  Sig:  Inject 0.5 mg into the skin once a week.   DX Code:    E11.9                 EPA submitted, LOV 3/23 and A1C 2/20  Awaiting determination

## 2024-04-04 ENCOUNTER — PATIENT MESSAGE (OUTPATIENT)
Facility: CLINIC | Age: 29
End: 2024-04-04

## 2024-04-04 ENCOUNTER — TELEMEDICINE (OUTPATIENT)
Facility: CLINIC | Age: 29
End: 2024-04-04
Payer: COMMERCIAL

## 2024-04-04 DIAGNOSIS — I27.20 PULMONARY HYPERTENSION (HCC): ICD-10-CM

## 2024-04-04 DIAGNOSIS — R06.02 SHORTNESS OF BREATH: Primary | ICD-10-CM

## 2024-04-04 PROCEDURE — 99213 OFFICE O/P EST LOW 20 MIN: CPT | Performed by: INTERNAL MEDICINE

## 2024-04-04 NOTE — PROGRESS NOTES
[facility] Pulmonary Follow Up Note    This visit is conducted using Telemedicine with live, interactive video and audio.    Patient has been referred to the On license of [facility] website at [email or web address] to review the yearly Consent to Treat document.    Chief Complaint:  Chief Complaint   Patient presents with    Dyspnea       History of Present Illness:  Concepcion Alvarado is a 28 year old female who presents today for follow up of shortness of breath.  Patient was in her usual state of health when 2 weeks ago she had sudden onset shortness of breath.  She had workup at another pulmonologist office and a CT angiogram with PE was performed as well as echo that showed some possible pulmonary hypertension was performed.  She had a subsequent right heart cath with normal right-sided pressures.  Chest x-ray performed, which was normal.  Patient is a never smoker.  She has gotten to the point where she is getting winded with very minimal exertion     Interval history:  Since last visit, patient's shortness of breath is minimally changed.  Pulmonary function test were essentially normal except for mild restrictive defect, which can be seen with obesity      Past Medical History:   Past Medical History:   Diagnosis Date    ALCOHOL USE     Celiac disease (HCC)     Obesity     PCOS (polycystic ovarian syndrome)         Past Surgical History:   Past Surgical History:   Procedure Laterality Date    TONSILLECTOMY         Family Medical History:   Family History   Problem Relation Age of Onset    Diabetes Mother     Obesity Father         Social History:   Social History     Socioeconomic History    Marital status:      Spouse name: Not on file    Number of children: Not on file    Years of education: Not on file    Highest education level: Not on file   Occupational History    Not on file   Tobacco Use    Smoking status: Never    Smokeless tobacco: Never   Vaping Use    Vaping Use: Never used   Substance and Sexual Activity    Alcohol  use: Yes     Alcohol/week: 3.0 standard drinks of alcohol     Types: 3 Standard drinks or equivalent per week     Comment: during the week    Drug use: Never    Sexual activity: Not on file   Other Topics Concern    Not on file   Social History Narrative    Not on file     Social Determinants of Health     Financial Resource Strain: Not on file   Food Insecurity: No Food Insecurity (3/13/2024)    Food Insecurity     Food Insecurity: Never true   Transportation Needs: No Transportation Needs (3/13/2024)    Transportation Needs     Lack of Transportation: No   Physical Activity: Not on file   Stress: Not on file   Social Connections: Not on file   Housing Stability: Low Risk  (3/13/2024)    Housing Stability     Housing Instability: No     Housing Instability Emergency: Not on file        Medications:   Current Outpatient Medications   Medication Sig Dispense Refill    metFORMIN  MG Oral Tablet 24 Hr Take 1 tablet (500 mg total) by mouth daily with breakfast. 90 tablet 1    semaglutide (OZEMPIC, 0.25 OR 0.5 MG/DOSE,) 2 MG/3ML Subcutaneous Solution Pen-injector Inject 0.5 mg into the skin once a week. 9 mL 1       Review of Systems: Review of Systems     Physical Exam:  There were no vitals taken for this visit.     Physical exam deferred due to video visit    Results:  Personally reviewed  XR CHEST AP PORTABLE  (CPT=71045)  Narrative: PROCEDURE: XR CHEST AP PORTABLE  (CPT=71045)  TIME: 1601.       COMPARISON: [facility], XR CHEST AP PORTABLE (CPT=71045), 2/16/2024, 10:04 PM.4     INDICATIONS: Syncope today.     TECHNIQUE:   Single view.       FINDINGS:   CARDIAC/VASC: No cardiac silhouette abnormality or cardiomegaly.  Unremarkable pulmonary vasculature.   MEDIAST/DANII:   No visible mass or adenopathy.  LUNGS/PLEURA: No significant pulmonary parenchymal abnormalities.  No effusion or pleural thickening.  BONES: No fracture or visible bony lesion.  OTHER: Negative.                Impression:  CONCLUSION:      Negative for radiographically evident acute intrathoracic process.     Dictated by (CST): Anthony Forte MD on 3/13/2024 at 4:33 PM       Finalized by (CST): Anthony Forte MD on 3/13/2024 at 4:34 PM              PFTs:       No data to display                   No data to display                    WBC: 5.4, done on 3/14/2024.  HGB: 12, done on 3/14/2024.  PLT: 311, done on 3/14/2024.     Glucose: 99, done on 3/14/2024.  Cr: 0.71, done on 3/14/2024.  CA: 9.7, done on 3/14/2024.  Na: 139, done on 3/14/2024.  K: 3.9, done on 3/14/2024.  Cl: 105, done on 3/14/2024.  CO2: 28, done on 3/14/2024.  Last ALB was 4.4% done on 3/12/2024.     XR CHEST AP PORTABLE  (CPT=71045)    Result Date: 3/13/2024  CONCLUSION:   Negative for radiographically evident acute intrathoracic process.  Dictated by (CST): Anthony Forte MD on 3/13/2024 at 4:33 PM     Finalized by (CST): Anthony Forte MD on 3/13/2024 at 4:34 PM          XR CHEST AP PORTABLE  (CPT=71045)    Result Date: 2/17/2024  CONCLUSION: Normal examination.     Dictated by (CST): Sathya Gould MD on 2/17/2024 at 7:44 AM     Finalized by (CST): Sathya Gould MD on 2/17/2024 at 7:44 AM            Assessment/Plan:  #1.  Shortness of breath/pulm hypertension  Patient has plans to follow-up with [facility] for pulmonary hypertension evaluation  If this does not pan out, recommended she pursue cardiopulmonary exercise testing as last resort for unexplained cause of dyspnea  Restrictive ventilatory defect noted on pulmonary function test, differential includes heart failure, fluid overload, interstitial lung disease, thoracic/spine disorders, and obesity.  After reviewing the above differential, obesity is likely because of pulmonary function test abnormality  Patient was counseled on weight loss      No follow-ups on file.    I spent 20 minutes obtaining and reviewing records, preparing for the visit including reviewing chart and prior testing, face  to face time examining the patient and obtaining history, counseling, arranging and reviewing office-based testing, independently reviewing relevant studies and documentation exclusive of other billable procedures.      Lorie Cody MD  4/4/2024

## 2024-04-04 NOTE — TELEPHONE ENCOUNTER
From: Concepcion Alvarado  To: Lorie Cody  Sent: 4/4/2024 11:29 AM CDT  Subject: Repeated echo results    Good morning.     I wanted to send a copy of my echo results in preparation of our scheduled call later, just in case there is anything you wanted to look over.    Thank you

## 2024-04-10 ENCOUNTER — TELEPHONE (OUTPATIENT)
Dept: ENDOCRINOLOGY CLINIC | Facility: CLINIC | Age: 29
End: 2024-04-10

## 2024-04-10 NOTE — TELEPHONE ENCOUNTER
Current Outpatient Medications   Medication Sig Dispense Refill    semaglutide (OZEMPIC, 0.25 OR 0.5 MG/DOSE,) 2 MG/3ML Subcutaneous Solution Pen-injector Inject 0.5 mg into the skin once a week. 9 mL 1     Key: [id number]

## 2024-09-06 ENCOUNTER — HOSPITAL ENCOUNTER (EMERGENCY)
Facility: HOSPITAL | Age: 29
Discharge: HOME | End: 2024-09-06
Attending: STUDENT IN AN ORGANIZED HEALTH CARE EDUCATION/TRAINING PROGRAM
Payer: COMMERCIAL

## 2024-09-06 ENCOUNTER — APPOINTMENT (OUTPATIENT)
Dept: GENERAL RADIOLOGY | Facility: HOSPITAL | Age: 29
End: 2024-09-06
Attending: STUDENT IN AN ORGANIZED HEALTH CARE EDUCATION/TRAINING PROGRAM
Payer: COMMERCIAL

## 2024-09-06 ENCOUNTER — APPOINTMENT (OUTPATIENT)
Dept: ULTRASOUND IMAGING | Facility: HOSPITAL | Age: 29
End: 2024-09-06
Attending: STUDENT IN AN ORGANIZED HEALTH CARE EDUCATION/TRAINING PROGRAM
Payer: COMMERCIAL

## 2024-09-06 VITALS
BODY MASS INDEX: 47.09 KG/M2 | HEART RATE: 64 BPM | OXYGEN SATURATION: 99 % | TEMPERATURE: 98 F | SYSTOLIC BLOOD PRESSURE: 108 MMHG | WEIGHT: 293 LBS | DIASTOLIC BLOOD PRESSURE: 80 MMHG | RESPIRATION RATE: 18 BRPM | HEIGHT: 66 IN

## 2024-09-06 DIAGNOSIS — M54.2 NECK PAIN ON RIGHT SIDE: Primary | ICD-10-CM

## 2024-09-06 LAB
ANION GAP SERPL CALC-SCNC: 6 MMOL/L (ref 0–18)
BASOPHILS # BLD AUTO: 0.03 X10(3) UL (ref 0–0.2)
BASOPHILS NFR BLD AUTO: 0.5 %
BUN BLD-MCNC: 14 MG/DL (ref 9–23)
BUN/CREAT SERPL: 20 (ref 10–20)
CALCIUM BLD-MCNC: 10.1 MG/DL (ref 8.7–10.4)
CHLORIDE SERPL-SCNC: 107 MMOL/L (ref 98–112)
CO2 SERPL-SCNC: 25 MMOL/L (ref 21–32)
CREAT BLD-MCNC: 0.7 MG/DL
DEPRECATED RDW RBC AUTO: 44.6 FL (ref 35.1–46.3)
EGFRCR SERPLBLD CKD-EPI 2021: 121 ML/MIN/1.73M2 (ref 60–?)
EOSINOPHIL # BLD AUTO: 0.11 X10(3) UL (ref 0–0.7)
EOSINOPHIL NFR BLD AUTO: 2 %
ERYTHROCYTE [DISTWIDTH] IN BLOOD BY AUTOMATED COUNT: 13.5 % (ref 11–15)
GLUCOSE BLD-MCNC: 95 MG/DL (ref 70–99)
HCT VFR BLD AUTO: 39.7 %
HGB BLD-MCNC: 12.9 G/DL
IMM GRANULOCYTES # BLD AUTO: 0.01 X10(3) UL (ref 0–1)
IMM GRANULOCYTES NFR BLD: 0.2 %
LYMPHOCYTES # BLD AUTO: 2.75 X10(3) UL (ref 1–4)
LYMPHOCYTES NFR BLD AUTO: 49.1 %
MCH RBC QN AUTO: 29.2 PG (ref 26–34)
MCHC RBC AUTO-ENTMCNC: 32.5 G/DL (ref 31–37)
MCV RBC AUTO: 89.8 FL
MONOCYTES # BLD AUTO: 0.34 X10(3) UL (ref 0.1–1)
MONOCYTES NFR BLD AUTO: 6.1 %
NEUTROPHILS # BLD AUTO: 2.36 X10 (3) UL (ref 1.5–7.7)
NEUTROPHILS # BLD AUTO: 2.36 X10(3) UL (ref 1.5–7.7)
NEUTROPHILS NFR BLD AUTO: 42.1 %
OSMOLALITY SERPL CALC.SUM OF ELEC: 286 MOSM/KG (ref 275–295)
PLATELET # BLD AUTO: 315 10(3)UL (ref 150–450)
POTASSIUM SERPL-SCNC: 4.8 MMOL/L (ref 3.5–5.1)
RBC # BLD AUTO: 4.42 X10(6)UL
SODIUM SERPL-SCNC: 138 MMOL/L (ref 136–145)
WBC # BLD AUTO: 5.6 X10(3) UL (ref 4–11)

## 2024-09-06 PROCEDURE — 99284 EMERGENCY DEPT VISIT MOD MDM: CPT

## 2024-09-06 PROCEDURE — 96375 TX/PRO/DX INJ NEW DRUG ADDON: CPT

## 2024-09-06 PROCEDURE — 80048 BASIC METABOLIC PNL TOTAL CA: CPT | Performed by: STUDENT IN AN ORGANIZED HEALTH CARE EDUCATION/TRAINING PROGRAM

## 2024-09-06 PROCEDURE — 99285 EMERGENCY DEPT VISIT HI MDM: CPT

## 2024-09-06 PROCEDURE — 71045 X-RAY EXAM CHEST 1 VIEW: CPT | Performed by: STUDENT IN AN ORGANIZED HEALTH CARE EDUCATION/TRAINING PROGRAM

## 2024-09-06 PROCEDURE — 85025 COMPLETE CBC W/AUTO DIFF WBC: CPT | Performed by: STUDENT IN AN ORGANIZED HEALTH CARE EDUCATION/TRAINING PROGRAM

## 2024-09-06 PROCEDURE — 96374 THER/PROPH/DIAG INJ IV PUSH: CPT

## 2024-09-06 PROCEDURE — 76536 US EXAM OF HEAD AND NECK: CPT | Performed by: STUDENT IN AN ORGANIZED HEALTH CARE EDUCATION/TRAINING PROGRAM

## 2024-09-06 RX ORDER — KETOROLAC TROMETHAMINE 15 MG/ML
15 INJECTION, SOLUTION INTRAMUSCULAR; INTRAVENOUS ONCE
Status: COMPLETED | OUTPATIENT
Start: 2024-09-06 | End: 2024-09-06

## 2024-09-06 RX ORDER — HYDROCODONE BITARTRATE AND ACETAMINOPHEN 7.5; 325 MG/1; MG/1
1-2 TABLET ORAL EVERY 6 HOURS PRN
Qty: 10 TABLET | Refills: 0 | Status: SHIPPED | OUTPATIENT
Start: 2024-09-06 | End: 2024-09-11

## 2024-09-06 RX ORDER — MORPHINE SULFATE 4 MG/ML
4 INJECTION, SOLUTION INTRAMUSCULAR; INTRAVENOUS ONCE
Status: COMPLETED | OUTPATIENT
Start: 2024-09-06 | End: 2024-09-06

## 2024-09-06 NOTE — ED PROVIDER NOTES
[facility] Emergency Department Note  Patient: Concepcion Alvarado Age: 29 year old Sex: female      MRN: [id number]  : 1995    Patient Seen in: [facility] Emergency Department    History     Chief Complaint   Patient presents with    Neck Pain    Postop/Procedure Problem     Stated Complaint: pain    History obtained from: patient    Patient is a 29-year-old female the past med history of pulmonary hypertension, type 2 diabetes, GUNJAN, mitral valve regurgitation presenting for evaluation of right-sided neck pain over the past 1 day after a right heart cath earlier today at [facility].  She states that since the procedure, she has been having pain at the access site of the right IJ.  States that pain is worse with range of motion of the right neck states that she is otherwise had no change of her shortness of breath or chest pain since before her procedure.  She denies any numbness, tingling, weakness, slurred speech or vision changes.  She states that she went to [place] emergency department and left from the waiting room due to long wait times    Review of Systems:  Review of Systems  Positive for stated complaint: pain. Constitutional and vital signs reviewed. All other systems reviewed and negative except as noted above.    Patient History:  Past Medical History:    ALCOHOL USE    Celiac disease (HCC)    Diabetes (HCC)    Obesity    PCOS (polycystic ovarian syndrome)       Past Surgical History:   Procedure Laterality Date    Tonsillectomy          Family History   Problem Relation Age of Onset    Diabetes Mother     Obesity Father        Specific Social Determinants of Health:   Social History     Socioeconomic History    Marital status:    Tobacco Use    Smoking status: Never    Smokeless tobacco: Never   Vaping Use    Vaping status: Never Used   Substance and Sexual Activity    Alcohol use: Yes     Alcohol/week: 3.0 standard drinks of alcohol     Types: 3 Standard  drinks or equivalent per week     Comment: during the week    Drug use: Yes     Types: Cannabis     Comment: occassionally per patient     Social Determinants of Health     Financial Resource Strain: Low Risk  (7/31/2024)    Received from [facility]    Overall Financial Resource Strain (CARDIA)     Difficulty of Paying Living Expenses: Not hard at all   Food Insecurity: No Food Insecurity (7/31/2024)    Received from [facility]    Hunger Vital Sign     Worried About Running Out of Food in the Last Year: Never true     Ran Out of Food in the Last Year: Never true   Transportation Needs: No Transportation Needs (7/31/2024)    Received from [facility]    PRAPARE - Transportation     Lack of Transportation (Medical): No     Lack of Transportation (Non-Medical): No   Housing Stability: Unknown (7/31/2024)    Received from [facility]    Housing Stability Vital Sign     Unable to Pay for Housing in the Last Year: No     In the last 12 months, was there a time when you did not have a steady place to sleep or slept in a shelter (including now)?: No           PSFH elements reviewed from today and agreed except as otherwise stated in HPI.    Physical Exam     ED Triage Vitals [09/06/24 0210]   /72   Pulse 62   Resp 18   Temp 97.8 °F (36.6 °C)   Temp src Oral   SpO2 99 %   O2 Device None (Room air)       Current:/80   Pulse 64   Temp 97.8 °F (36.6 °C) (Oral)   Resp 18   Ht 167.6 cm (5' 6\")   Wt (!) 161 kg   LMP 08/15/2024 (Approximate)   SpO2 99%   Breastfeeding No   BMI 57.30 kg/m²         Physical Exam  Constitutional:       General: She is not in acute distress.  HENT:      Head: Normocephalic and atraumatic.      Mouth/Throat:      Mouth: Mucous membranes are moist.   Eyes:      Extraocular Movements: Extraocular movements intact.   Neck:      Comments: Right IJ access site with mild tenderness to palpation but  otherwise no obvious hematoma.  No significant swelling.  No overlying or surrounding erythema, no expressible discharge or drainage; no meningismus  Cardiovascular:      Rate and Rhythm: Normal rate and regular rhythm.      Heart sounds: Normal heart sounds.   Pulmonary:      Effort: Pulmonary effort is normal. No respiratory distress.      Breath sounds: Normal breath sounds.   Abdominal:      Palpations: Abdomen is soft.      Tenderness: There is no abdominal tenderness.   Musculoskeletal:      Cervical back: Neck supple.   Skin:     General: Skin is warm and dry.      Capillary Refill: Capillary refill takes less than 2 seconds.      Findings: No rash.   Neurological:      General: No focal deficit present.      Mental Status: She is alert and oriented to person, place, and time.   Psychiatric:         Mood and Affect: Mood normal.         Behavior: Behavior normal.         ED Course   Labs:   Labs Reviewed   BASIC METABOLIC PANEL (8) - Normal   CBC WITH DIFFERENTIAL WITH PLATELET     Radiology findings:  I personally reviewed the images.   No results found.    Comparison March 13, 2024  AP chest x-ray  IMPRESSION:  Mild right-sided bronchial wall thickening.  No consolidation, effusion, or pneumothorax.    Ultrasound of the neck  IMPRESSION:  No subcutaneous hematoma.  The right internal jugular vein is patent.      Cardiac Monitor: Interpreted by me.   Pulse Readings from Last 1 Encounters:   09/06/24 64   , sinus,     External non-ED records reviewed independently by me: H&P from interventional cardiology APN yesterday reviewed confirming patient has past medical history of pulmonary hypertension and mitral valve regurgitation.  Underwent right heart cath for workup of dyspnea on exertion    MDM   29-year-old female with a past medical history of pulmonary hypertension, type 2 diabetes, GUNJAN, mitral valve regurgitation presenting for evaluation of right side neck pain over the past day after right IJ access  for right heart cath earlier this afternoon.  Upon arrival to emergency department, she is well-appearing and in no acute distress.  Vitals are within normal limits.  Access site exam appears without complication.  Exam as above    Differential diagnoses considered includes, but is not limited to: Hematoma, IJ thrombosis, pneumothorax, postop pain from localized trauma    Will obtain the following tests: Chest x-ray, neck ultrasound, CBC, BMP  Please see ED course for my independent review of these tests/imaging results.    Initial Medications/Therapeutics administered: Toradol, morphine    Chronic conditions affecting care: Pulmonary hypertension, type 2 diabetes, GUNJAN, mitral valve regurgitation    Workup and medications considered but not ordered: None    Social Determinants of Health that impacted care: None    ED Course as of 09/06/24 0439  ------------------------------------------------------------  Time: 09/06 0316  Comment: I independently reviewed the chest x-ray images that show no evidence of pneumothorax.  Agree with radiology reads above  ------------------------------------------------------------  Time: 09/06 0437  Comment: Ultrasound with no evidence of hematoma right IJ thrombosis.  I suspect her pain is likely secondary to postoperative pain from localized trauma.  We discussed unremarkable workup and symptomatic management including analgesic medications.  Patient states that cardiology team \"told me it was not going hurt\".  We reviewed unremarkable workup with no evidence of postop complications.  Discussed need for close follow-up.  Return precautions provided and all questions answered.  Patient expressed understanding and agreement with plan.  Stable for discharge home at this time            Disposition and Plan     Clinical Impression:  1. Neck pain on right side        Disposition:  Discharge    Follow-up:  Deacon Coley MD  [address] [place] [address]  [address]  [facility]  [address]  [phone]    Schedule an appointment as soon as possible for a visit in 2 day(s)  As needed, If symptoms worsen      Medications Prescribed:  Discharge Medication List as of 9/6/2024  4:21 AM        START taking these medications    Details   HYDROcodone-acetaminophen 7.5-325 MG Oral Tab Take 1-2 tablets by mouth every 6 (six) hours as needed for Pain., Normal, Disp-10 tablet, R-0               This note may have been created using voice dictation technology and may include inadvertent errors.      Brianne Allen MD  Emergency Medicine

## 2024-09-06 NOTE — DISCHARGE INSTRUCTIONS
Thank you for seeking care at [facility] Emergency Department.    You were prescribed a strong pain medication called Norco which is a narcotic. This is a strong medication that can be addictive if not taken as instructed or if taken regularly. Please take as prescribed, and limit use when not absolutely needed and only when other medications recommended do not control your symptoms well enough. Do not operate heavy machinery, drive a car, exercise, or perform important or complicated tasks while taking this medication as it can make you drowsy and prone to mental lapses. This medication can cause your breathing to slow down to dangerous levels or stop. This can occur if you take too much medication, mix with certain other medications (such as sleep or anxiety medications), or mix with other substances such as alcohol, other drugs - please take only as prescribed. This medication can make you constipated, so please stay well hydrated and take fiber supplements. Contact your primary care doctor or return to the emergency department if this medication does not control your pain. Be sure to follow up with your primary care provider as advised.

## 2024-09-06 NOTE — ED INITIAL ASSESSMENT (HPI)
PT to ED via [facility]    PT reporting R heart cath on 09/05 denies stent placement, reporting neck pain starting after the heart cath 9/10 non radiating not relieved by 650 mg of tylenol taking at approx 1900.

## 2024-09-07 ENCOUNTER — APPOINTMENT (OUTPATIENT)
Dept: CT IMAGING | Facility: HOSPITAL | Age: 29
End: 2024-09-07
Attending: EMERGENCY MEDICINE
Payer: COMMERCIAL

## 2024-09-07 ENCOUNTER — HOSPITAL ENCOUNTER (EMERGENCY)
Facility: HOSPITAL | Age: 29
Discharge: HOME | End: 2024-09-07
Attending: EMERGENCY MEDICINE
Payer: COMMERCIAL

## 2024-09-07 VITALS
DIASTOLIC BLOOD PRESSURE: 85 MMHG | OXYGEN SATURATION: 99 % | RESPIRATION RATE: 16 BRPM | SYSTOLIC BLOOD PRESSURE: 154 MMHG | TEMPERATURE: 97 F | HEART RATE: 51 BPM

## 2024-09-07 DIAGNOSIS — M54.2 NECK PAIN ON RIGHT SIDE: Primary | ICD-10-CM

## 2024-09-07 LAB
ALBUMIN SERPL-MCNC: 4.6 G/DL (ref 3.2–4.8)
ALP LIVER SERPL-CCNC: 55 U/L
ALT SERPL-CCNC: 17 U/L
ANION GAP SERPL CALC-SCNC: 6 MMOL/L (ref 0–18)
AST SERPL-CCNC: 15 U/L (ref ?–34)
ATRIAL RATE: 55 BPM
BASOPHILS # BLD AUTO: 0.01 X10(3) UL (ref 0–0.2)
BASOPHILS NFR BLD AUTO: 0.2 %
BILIRUB DIRECT SERPL-MCNC: 0.1 MG/DL (ref ?–0.3)
BILIRUB SERPL-MCNC: 0.4 MG/DL (ref 0.3–1.2)
BUN BLD-MCNC: 14 MG/DL (ref 9–23)
BUN/CREAT SERPL: 16.9 (ref 10–20)
CALCIUM BLD-MCNC: 9.7 MG/DL (ref 8.7–10.4)
CHLORIDE SERPL-SCNC: 105 MMOL/L (ref 98–112)
CO2 SERPL-SCNC: 28 MMOL/L (ref 21–32)
CREAT BLD-MCNC: 0.83 MG/DL
DEPRECATED RDW RBC AUTO: 44.7 FL (ref 35.1–46.3)
EGFRCR SERPLBLD CKD-EPI 2021: 98 ML/MIN/1.73M2 (ref 60–?)
EOSINOPHIL # BLD AUTO: 0.09 X10(3) UL (ref 0–0.7)
EOSINOPHIL NFR BLD AUTO: 1.9 %
ERYTHROCYTE [DISTWIDTH] IN BLOOD BY AUTOMATED COUNT: 13.5 % (ref 11–15)
GLUCOSE BLD-MCNC: 87 MG/DL (ref 70–99)
HCT VFR BLD AUTO: 41 %
HGB BLD-MCNC: 13.2 G/DL
IMM GRANULOCYTES # BLD AUTO: 0.01 X10(3) UL (ref 0–1)
IMM GRANULOCYTES NFR BLD: 0.2 %
LYMPHOCYTES # BLD AUTO: 2.29 X10(3) UL (ref 1–4)
LYMPHOCYTES NFR BLD AUTO: 48.1 %
MCH RBC QN AUTO: 29.2 PG (ref 26–34)
MCHC RBC AUTO-ENTMCNC: 32.2 G/DL (ref 31–37)
MCV RBC AUTO: 90.7 FL
MONOCYTES # BLD AUTO: 0.27 X10(3) UL (ref 0.1–1)
MONOCYTES NFR BLD AUTO: 5.7 %
NEUTROPHILS # BLD AUTO: 2.09 X10 (3) UL (ref 1.5–7.7)
NEUTROPHILS # BLD AUTO: 2.09 X10(3) UL (ref 1.5–7.7)
NEUTROPHILS NFR BLD AUTO: 43.9 %
OSMOLALITY SERPL CALC.SUM OF ELEC: 288 MOSM/KG (ref 275–295)
P AXIS: 39 DEGREES
P-R INTERVAL: 154 MS
PLATELET # BLD AUTO: 319 10(3)UL (ref 150–450)
POTASSIUM SERPL-SCNC: 4.1 MMOL/L (ref 3.5–5.1)
PROT SERPL-MCNC: 7.9 G/DL (ref 5.7–8.2)
Q-T INTERVAL: 426 MS
QRS DURATION: 104 MS
QTC CALCULATION (BEZET): 407 MS
R AXIS: -5 DEGREES
RBC # BLD AUTO: 4.52 X10(6)UL
SODIUM SERPL-SCNC: 139 MMOL/L (ref 136–145)
T AXIS: 40 DEGREES
TROPONIN I SERPL HS-MCNC: 3 NG/L
VENTRICULAR RATE: 55 BPM
WBC # BLD AUTO: 4.8 X10(3) UL (ref 4–11)

## 2024-09-07 PROCEDURE — 96375 TX/PRO/DX INJ NEW DRUG ADDON: CPT

## 2024-09-07 PROCEDURE — 93010 ELECTROCARDIOGRAM REPORT: CPT

## 2024-09-07 PROCEDURE — 99284 EMERGENCY DEPT VISIT MOD MDM: CPT

## 2024-09-07 PROCEDURE — 70498 CT ANGIOGRAPHY NECK: CPT | Performed by: EMERGENCY MEDICINE

## 2024-09-07 PROCEDURE — 96374 THER/PROPH/DIAG INJ IV PUSH: CPT

## 2024-09-07 PROCEDURE — 93005 ELECTROCARDIOGRAM TRACING: CPT

## 2024-09-07 PROCEDURE — 70496 CT ANGIOGRAPHY HEAD: CPT | Performed by: EMERGENCY MEDICINE

## 2024-09-07 PROCEDURE — 80048 BASIC METABOLIC PNL TOTAL CA: CPT | Performed by: EMERGENCY MEDICINE

## 2024-09-07 PROCEDURE — 80076 HEPATIC FUNCTION PANEL: CPT | Performed by: EMERGENCY MEDICINE

## 2024-09-07 PROCEDURE — 84484 ASSAY OF TROPONIN QUANT: CPT | Performed by: EMERGENCY MEDICINE

## 2024-09-07 PROCEDURE — 85025 COMPLETE CBC W/AUTO DIFF WBC: CPT | Performed by: EMERGENCY MEDICINE

## 2024-09-07 PROCEDURE — 99285 EMERGENCY DEPT VISIT HI MDM: CPT

## 2024-09-07 RX ORDER — LIDOCAINE 50 MG/G
1 PATCH TOPICAL EVERY 24 HOURS
Qty: 14 PATCH | Refills: 0 | Status: SHIPPED | OUTPATIENT
Start: 2024-09-07 | End: 2024-09-21

## 2024-09-07 RX ORDER — KETOROLAC TROMETHAMINE 15 MG/ML
15 INJECTION, SOLUTION INTRAMUSCULAR; INTRAVENOUS ONCE
Status: COMPLETED | OUTPATIENT
Start: 2024-09-07 | End: 2024-09-07

## 2024-09-07 RX ORDER — MORPHINE SULFATE 4 MG/ML
4 INJECTION, SOLUTION INTRAMUSCULAR; INTRAVENOUS ONCE
Status: COMPLETED | OUTPATIENT
Start: 2024-09-07 | End: 2024-09-07

## 2024-09-07 NOTE — ED INITIAL ASSESSMENT (HPI)
Pt c/o of R neck pain, chest pain, jaw pain and R arm tingling.   Per  pt had a carotid cath procedure done on 09/05 and since then she had had this symptoms. Pt was seen in ED yesterday for the same symptoms . Denies dizziness.

## 2024-09-07 NOTE — ED PROVIDER NOTES
Patient Seen in: [facility] Emergency Department      History     Chief Complaint   Patient presents with    Chest Pain Angina    Neck Pain     Stated Complaint: Chest Pain    Subjective:   HPI        Objective:   Past Medical History:    ALCOHOL USE    Celiac disease (HCC)    Diabetes (HCC)    Obesity    PCOS (polycystic ovarian syndrome)              Past Surgical History:   Procedure Laterality Date    Tonsillectomy                  Social History     Socioeconomic History    Marital status:    Tobacco Use    Smoking status: Never    Smokeless tobacco: Never   Vaping Use    Vaping status: Never Used   Substance and Sexual Activity    Alcohol use: Yes     Alcohol/week: 3.0 standard drinks of alcohol     Types: 3 Standard drinks or equivalent per week     Comment: during the week    Drug use: Yes     Types: Cannabis     Comment: occassionally per patient     Social Determinants of Health     Financial Resource Strain: Low Risk  (7/31/2024)    Received from [facility]    Overall Financial Resource Strain (CARDIA)     Difficulty of Paying Living Expenses: Not hard at all   Food Insecurity: No Food Insecurity (7/31/2024)    Received from [facility]    Hunger Vital Sign     Worried About Running Out of Food in the Last Year: Never true     Ran Out of Food in the Last Year: Never true   Transportation Needs: No Transportation Needs (7/31/2024)    Received from [facility]    PRAPARE - Transportation     Lack of Transportation (Medical): No     Lack of Transportation (Non-Medical): No   Housing Stability: Unknown (7/31/2024)    Received from [facility]    Housing Stability Vital Sign     Unable to Pay for Housing in the Last Year: No     In the last 12 months, was there a time when you did not have a steady place to sleep or slept in a shelter (including now)?: No              Review of Systems    Positive for stated  Chief Complaint: Chest Pain Angina and Neck Pain    Other systems are as noted in HPI.  Constitutional and vital signs reviewed.      All other systems reviewed and negative except as noted above.    Physical Exam     ED Triage Vitals [09/07/24 1245]   /88   Pulse 55   Resp 18   Temp 97 °F (36.1 °C)   Temp src    SpO2 98 %   O2 Device None (Room air)       Current Vitals:   Vital Signs  BP: 154/85  Pulse: 51  Resp: 16  Temp: 97 °F (36.1 °C)  MAP (mmHg): (!) 105    Oxygen Therapy  SpO2: 99 %  O2 Device: None (Room air)            Physical Exam          ED Course     Labs Reviewed   HEPATIC FUNCTION PANEL (7) - Normal   BASIC METABOLIC PANEL (8) - Normal   TROPONIN I HIGH SENSITIVITY - Normal   CBC WITH DIFFERENTIAL WITH PLATELET     EKG    Rate, intervals and axes as noted on EKG Report.  Rate: 55  Rhythm: Sinus Rhythm  Reading: Sinus bradycardia without signs of acute ischemia or abnormal intervals                          MDM      29-year-old female with history of pulmonary hypertension, diabetes, mitral regurgitation, and who had a right heart catheterization via right IJ on September 5 presents today for the second time in 2 days with pain in her right neck.  In the ED yesterday, she was evaluated with labs and ultrasound which were reassuring and discharged with a prescription for Norco.  Patient states that despite the Norco, she is having significant pain in the right neck which radiates towards her jaw, chest, and right upper extremity.  Denies fevers, difficulty breathing, extremity pain/swelling/numbness/tingling, facial droop, blurry vision, difficulty speaking, or other symptoms.    On exam, vitals normal, well-appearing, right IJ incision site clean without surrounding erythema, induration, fluctuance, or any purulence.  No significant swelling.  No crepitus.  No bruits.  Cardiopulmonary exam reassuring.    Differential: Site infection, vascular dissection, nerve injury, normal postoperative  pain    Patient was evaluated again with labs which were normal.  I performed a CTA brain and carotids which did not show any acute pathology.    Patient was treated with Toradol and morphine in the ED with limited improvement in pain.  Will advise patient on analgesic regimen at home, PMD and cardiology follow-up, and return precautions.                                   MDM    Disposition and Plan     Clinical Impression:  1. Neck pain on right side         Disposition:  Discharge  9/7/2024  3:06 pm    Follow-up:  Your cardiologist and primary care doctor    Schedule an appointment as soon as possible for a visit            Medications Prescribed:  Discharge Medication List as of 9/7/2024  3:15 PM        START taking these medications    Details   lidocaine 5 % External Patch Place 1 patch onto the skin daily for 14 days., Normal, Disp-14 patch, R-0

## 2024-10-30 ENCOUNTER — TELEPHONE (OUTPATIENT)
Dept: CARDIOLOGY | Age: 29
End: 2024-10-30

## 2024-10-30 ENCOUNTER — CLINICAL DOCUMENTATION (OUTPATIENT)
Dept: CARDIOLOGY | Age: 29
End: 2024-10-30

## 2024-11-04 ENCOUNTER — TELEPHONE (OUTPATIENT)
Dept: CARDIOLOGY | Age: 29
End: 2024-11-04

## 2024-11-06 ENCOUNTER — OFFICE VISIT (OUTPATIENT)
Dept: CARDIOLOGY | Age: 29
End: 2024-11-06

## 2024-11-06 DIAGNOSIS — R06.02 SOB (SHORTNESS OF BREATH): Primary | ICD-10-CM

## 2024-11-06 DIAGNOSIS — I50.32 CHRONIC HEART FAILURE WITH PRESERVED EJECTION FRACTION  (CMD): ICD-10-CM

## 2024-11-06 RX ORDER — FUROSEMIDE 20 MG/1
1 TABLET ORAL EVERY OTHER DAY
COMMUNITY

## 2024-11-06 RX ORDER — IVABRADINE 5 MG/1
1 TABLET, FILM COATED ORAL 2 TIMES DAILY WITH MEALS
COMMUNITY
Start: 2024-10-21

## 2024-11-07 ENCOUNTER — TELEPHONE (OUTPATIENT)
Dept: CARDIOLOGY | Age: 29
End: 2024-11-07

## 2024-11-14 ENCOUNTER — TELEPHONE (OUTPATIENT)
Dept: CARDIOLOGY | Age: 29
End: 2024-11-14

## 2024-12-06 ENCOUNTER — TELEPHONE (OUTPATIENT)
Dept: CARDIOLOGY | Age: 29
End: 2024-12-06

## 2024-12-10 ENCOUNTER — APPOINTMENT (OUTPATIENT)
Dept: LAB | Age: 29
End: 2024-12-10

## 2024-12-10 ENCOUNTER — OFFICE VISIT (OUTPATIENT)
Dept: CARDIOLOGY | Age: 29
End: 2024-12-10

## 2024-12-10 ENCOUNTER — HOSPITAL ENCOUNTER (OUTPATIENT)
Dept: RESPIRATORY THERAPY | Age: 29
Discharge: HOME | End: 2024-12-10

## 2024-12-10 ENCOUNTER — HOSPITAL ENCOUNTER (OUTPATIENT)
Dept: CARDIOLOGY | Age: 29
Discharge: HOME | End: 2024-12-10

## 2024-12-10 VITALS
DIASTOLIC BLOOD PRESSURE: 86 MMHG | HEIGHT: 66 IN | SYSTOLIC BLOOD PRESSURE: 130 MMHG | WEIGHT: 293 LBS | BODY MASS INDEX: 47.09 KG/M2 | HEART RATE: 61 BPM | OXYGEN SATURATION: 99 %

## 2024-12-10 DIAGNOSIS — I27.20 PULMONARY HYPERTENSION  (CMD): Primary | ICD-10-CM

## 2024-12-10 DIAGNOSIS — R06.02 SOB (SHORTNESS OF BREATH): ICD-10-CM

## 2024-12-10 DIAGNOSIS — I50.32 CHRONIC HEART FAILURE WITH PRESERVED EJECTION FRACTION  (CMD): ICD-10-CM

## 2024-12-10 LAB
ANION GAP SERPL CALC-SCNC: 6 MMOL/L (ref 7–19)
AORTIC VALVE AREA (AVA): 0.93
ASCENDING AORTA (AAD): 3
AV STENOSIS SEVERITY TEXT: NORMAL
AVI LVOT PEAK GRADIENT (LVOTMG): 0.9
BUN SERPL-MCNC: 12 MG/DL (ref 6–20)
BUN/CREAT SERPL: 16 (ref 7–25)
CALCIUM SERPL-MCNC: 9.8 MG/DL (ref 8.4–10.2)
CHLORIDE SERPL-SCNC: 107 MMOL/L (ref 97–110)
CO2 SERPL-SCNC: 30 MMOL/L (ref 21–32)
CREAT SERPL-MCNC: 0.77 MG/DL (ref 0.51–0.95)
E WAVE DECELARATION TIME (MDT): 5.07
EGFRCR SERPLBLD CKD-EPI 2021: >90 ML/MIN/{1.73_M2}
FASTING DURATION TIME PATIENT: ABNORMAL H
GLUCOSE SERPL-MCNC: 88 MG/DL (ref 70–99)
LEFT INTERNAL DIMENSION IN SYSTOLE (LVSD): 1
LEFT VENTRICULAR INTERNAL DIMENSION IN DIASTOLE (LVDD): 3.1
LEFT VENTRICULAR POSTERIOR WALL IN END DIASTOLE (LVPW): 4.5
LV EF: NORMAL %
MV E TISSUE VEL MED (MESV): 18.5
MV E WAVE VEL/E TISSUE VEL MED(MSR): 18.3
MV PEAK A VELOCITY (MVPAV): 125
MV PEAK E VELOCITY (MVPEV): 0.48
NT-PROBNP SERPL-MCNC: <35 PG/ML
POTASSIUM SERPL-SCNC: 4 MMOL/L (ref 3.4–5.1)
SODIUM SERPL-SCNC: 139 MMOL/L (ref 135–145)
TRICUSPID VALVE ANNULAR PEAK VELOCITY (TVAPV): 36
TRICUSPID VALVE PEAK REGURGITATION VELOCITY (TRPV): 2.8
TV ESTIMATED RIGHT ARTERIAL PRESSURE (RAP): 15.4

## 2024-12-10 PROCEDURE — 94618 PULMONARY STRESS TESTING: CPT

## 2024-12-10 PROCEDURE — 80048 BASIC METABOLIC PNL TOTAL CA: CPT | Performed by: INTERNAL MEDICINE

## 2024-12-10 PROCEDURE — 93306 TTE W/DOPPLER COMPLETE: CPT

## 2024-12-10 PROCEDURE — 99212 OFFICE O/P EST SF 10 MIN: CPT | Performed by: NURSE PRACTITIONER

## 2024-12-10 PROCEDURE — 36415 COLL VENOUS BLD VENIPUNCTURE: CPT

## 2024-12-10 PROCEDURE — 93306 TTE W/DOPPLER COMPLETE: CPT | Performed by: INTERNAL MEDICINE

## 2024-12-10 PROCEDURE — 83880 ASSAY OF NATRIURETIC PEPTIDE: CPT | Performed by: INTERNAL MEDICINE

## 2024-12-10 RX ORDER — SILDENAFIL CITRATE 20 MG/1
20 TABLET ORAL 3 TIMES DAILY
COMMUNITY

## 2024-12-10 RX ORDER — BUMETANIDE 1 MG/1
1 TABLET ORAL DAILY
COMMUNITY

## 2024-12-16 ENCOUNTER — CLINICAL DOCUMENTATION (OUTPATIENT)
Dept: CARDIOLOGY | Age: 29
End: 2024-12-16

## 2024-12-16 ENCOUNTER — E-ADVICE (OUTPATIENT)
Dept: CARDIOLOGY | Age: 29
End: 2024-12-16

## 2024-12-16 DIAGNOSIS — R09.02 HYPOXIA: Primary | ICD-10-CM

## 2024-12-20 ENCOUNTER — HOSPITAL ENCOUNTER (OUTPATIENT)
Dept: RESPIRATORY THERAPY | Age: 29
End: 2024-12-20
Attending: NURSE PRACTITIONER

## 2024-12-20 DIAGNOSIS — R09.02 HYPOXIA: ICD-10-CM

## 2024-12-20 PROCEDURE — 10004180 HB COUNTER-TRANSPORT

## 2024-12-20 PROCEDURE — 94618 PULMONARY STRESS TESTING: CPT

## 2024-12-24 ENCOUNTER — CLINICAL DOCUMENTATION (OUTPATIENT)
Dept: CARDIOLOGY | Age: 29
End: 2024-12-24

## 2025-01-26 ENCOUNTER — E-ADVICE (OUTPATIENT)
Dept: CARDIOLOGY | Age: 30
End: 2025-01-26

## 2025-04-29 ENCOUNTER — TELEPHONE (OUTPATIENT)
Dept: CARDIOLOGY | Age: 30
End: 2025-04-29